# Patient Record
Sex: MALE | Race: AMERICAN INDIAN OR ALASKA NATIVE | NOT HISPANIC OR LATINO | Employment: UNEMPLOYED | URBAN - METROPOLITAN AREA
[De-identification: names, ages, dates, MRNs, and addresses within clinical notes are randomized per-mention and may not be internally consistent; named-entity substitution may affect disease eponyms.]

---

## 2018-12-13 ENCOUNTER — HOSPITAL ENCOUNTER (EMERGENCY)
Facility: HOSPITAL | Age: 38
Discharge: HOME | End: 2018-12-13
Attending: EMERGENCY MEDICINE
Payer: MEDICAID

## 2018-12-13 VITALS
TEMPERATURE: 98 F | DIASTOLIC BLOOD PRESSURE: 68 MMHG | OXYGEN SATURATION: 100 % | SYSTOLIC BLOOD PRESSURE: 146 MMHG | RESPIRATION RATE: 18 BRPM | HEART RATE: 71 BPM

## 2018-12-13 DIAGNOSIS — T78.40XA ALLERGIC REACTION, INITIAL ENCOUNTER: ICD-10-CM

## 2018-12-13 DIAGNOSIS — L50.9 URTICARIAL RASH: Primary | ICD-10-CM

## 2018-12-13 PROCEDURE — 63600175 PHARM REV CODE 636 W HCPCS: Performed by: NURSE PRACTITIONER

## 2018-12-13 PROCEDURE — 96372 THER/PROPH/DIAG INJ SC/IM: CPT

## 2018-12-13 PROCEDURE — 25000003 PHARM REV CODE 250: Performed by: NURSE PRACTITIONER

## 2018-12-13 PROCEDURE — 99284 EMERGENCY DEPT VISIT MOD MDM: CPT | Mod: 25

## 2018-12-13 RX ORDER — FAMOTIDINE 20 MG/1
20 TABLET, FILM COATED ORAL
Status: COMPLETED | OUTPATIENT
Start: 2018-12-13 | End: 2018-12-13

## 2018-12-13 RX ORDER — CETIRIZINE HYDROCHLORIDE 10 MG/1
10 TABLET ORAL DAILY
Qty: 30 TABLET | Refills: 0 | COMMUNITY
Start: 2018-12-13 | End: 2019-07-20

## 2018-12-13 RX ORDER — PREDNISONE 20 MG/1
40 TABLET ORAL DAILY
Qty: 10 TABLET | Refills: 0 | Status: SHIPPED | OUTPATIENT
Start: 2018-12-13 | End: 2018-12-20

## 2018-12-13 RX ORDER — CETIRIZINE HYDROCHLORIDE 10 MG/1
10 TABLET ORAL
Status: COMPLETED | OUTPATIENT
Start: 2018-12-13 | End: 2018-12-13

## 2018-12-13 RX ORDER — DEXAMETHASONE SODIUM PHOSPHATE 4 MG/ML
12 INJECTION, SOLUTION INTRA-ARTICULAR; INTRALESIONAL; INTRAMUSCULAR; INTRAVENOUS; SOFT TISSUE
Status: COMPLETED | OUTPATIENT
Start: 2018-12-13 | End: 2018-12-13

## 2018-12-13 RX ORDER — FAMOTIDINE 20 MG/1
20 TABLET, FILM COATED ORAL 2 TIMES DAILY
Qty: 14 TABLET | Refills: 0 | Status: SHIPPED | OUTPATIENT
Start: 2018-12-13 | End: 2019-07-20

## 2018-12-13 RX ORDER — DIPHENHYDRAMINE HCL 25 MG
25 CAPSULE ORAL EVERY 6 HOURS PRN
Qty: 20 CAPSULE | Refills: 0 | COMMUNITY
Start: 2018-12-13 | End: 2019-07-20

## 2018-12-13 RX ADMIN — FAMOTIDINE 20 MG: 20 TABLET ORAL at 01:12

## 2018-12-13 RX ADMIN — CETIRIZINE HYDROCHLORIDE 10 MG: 10 TABLET, FILM COATED ORAL at 01:12

## 2018-12-13 RX ADMIN — DEXAMETHASONE SODIUM PHOSPHATE 12 MG: 4 INJECTION, SOLUTION INTRAMUSCULAR; INTRAVENOUS at 01:12

## 2018-12-13 NOTE — ED PROVIDER NOTES
Encounter Date: 12/13/2018       History     Chief Complaint   Patient presents with    Urticaria     reports hives began yesterday,  took benadryl with relief. States hives returned, did not take another dose of benadryl.     38-year-old obese male which presents to the emergency room with itchy rash that began yesterday after he ate food at work.  Patient works at the [employer].  He states he is not allergic to anything.  He took a Benadryl yesterday which made it better and then when he woke up this morning some of the rash was back and it got worse as day went on.  Patient denies shortness of breath or scratchy throat.      The history is provided by the patient.     Review of patient's allergies indicates:  No Known Allergies  Past Medical History:   Diagnosis Date    Hypertension      No past surgical history on file.  No family history on file.  Social History     Tobacco Use    Smoking status: Never Smoker   Substance Use Topics    Alcohol use: No    Drug use: No     Review of Systems   Constitutional: Negative for fever.   HENT: Negative for sore throat.    Respiratory: Negative for cough, choking, chest tightness and shortness of breath.    Cardiovascular: Negative for chest pain and leg swelling.   Gastrointestinal: Negative for nausea.   Genitourinary: Negative for dysuria.   Musculoskeletal: Negative for back pain.   Skin: Positive for rash.   Neurological: Negative for weakness.   Hematological: Does not bruise/bleed easily.   All other systems reviewed and are negative.      Physical Exam     Initial Vitals [12/13/18 0133]   BP Pulse Resp Temp SpO2   (!) 154/102 109 18 97.6 °F (36.4 °C) 99 %      MAP       --         Physical Exam    Nursing note and vitals reviewed.  Constitutional: He appears well-developed and well-nourished. He is Obese . He is cooperative.  Non-toxic appearance.   HENT:   Head: Normocephalic and atraumatic.   Right Ear: External ear normal.   Left Ear: External ear  normal.   Nose: Nose normal.   Mouth/Throat: Oropharynx is clear and moist.   Eyes: Conjunctivae and EOM are normal. Pupils are equal, round, and reactive to light.   Neck: Normal range of motion.   Cardiovascular: Normal rate, regular rhythm, S1 normal, S2 normal, normal heart sounds, intact distal pulses and normal pulses. Exam reveals no gallop and no friction rub.    No murmur heard.  Pulmonary/Chest: Breath sounds normal. No stridor. No respiratory distress. He has no wheezes. He has no rhonchi. He has no rales. He exhibits no tenderness.   Abdominal: Normal appearance.   Musculoskeletal: Normal range of motion. He exhibits no edema or tenderness.   Lymphadenopathy:     He has no cervical adenopathy.   Neurological: He is alert and oriented to person, place, and time. He has normal strength. GCS score is 15. GCS eye subscore is 4. GCS verbal subscore is 5. GCS motor subscore is 6.   Skin: Rash noted. Rash is urticarial.   urticarial rash noted to entire body except face       ED Course   Procedures  Labs Reviewed - No data to display        Medical Decision Making:   Initial Assessment:   38-year-old male which presents to the emergency room for evaluation of a rash that began yesterday after he ate food at the [employer].  Patient works at the [employer] and ate some fish and began to have a rash after.  Patient took a Benadryl and rash went away.  He then woke this morning with a rash and as the day has gone on and has gotten worse.  He has not taken any medications today.  Differential Diagnosis:   Allergic reaction, urticaria  ED Management:  Patient examined and noted to have multiple urticarial lesions to entire body except face.  Patient give Decadron IM, Zyrtec Pepcid.  Patient not given Benadryl due to the fact that he is driving.  Patient is in no respiratory distress and will be sent home with 7 day course p.o. steroids, Pepcid b.i.d., Zyrtec daily, and Benadryl q.6 hours.  Patient  given strict return precautions and voiced understanding of all discharge instructions.  Patient stable at discharge.    Patient states that itching is much better after receiving the Decadron injection.                   ED Course as of Dec 13 0224   Thu Dec 13, 2018   0148 BP: (!) 154/102 [AT]   0148 Temp: 97.6 °F (36.4 °C) [AT]   0148 Temp src: Oral [AT]   0148 Pulse: 109 [AT]   0148 Resp: 18 [AT]   0148 SpO2: 99 % [AT]      ED Course User Index  [AT] JENNIFER Alexander     Clinical Impression:   The primary encounter diagnosis was Urticarial rash. A diagnosis of Allergic reaction, initial encounter was also pertinent to this visit.                             JENNIFER Alexander  12/13/18 0226

## 2018-12-13 NOTE — ED TRIAGE NOTES
Pt. Care assumed, pt. Is awake, alert and oriented. Skin is PWD. Denies c/o pain or discomfort, just itching.

## 2018-12-13 NOTE — ED NOTES
"Pt presents with raised itching red hives over entire body that started yesterday. Took Benadryl yesterday and states they "got better" but became worse tonight.  "

## 2019-06-05 ENCOUNTER — HOSPITAL ENCOUNTER (EMERGENCY)
Facility: HOSPITAL | Age: 39
Discharge: HOME | End: 2019-06-05
Attending: EMERGENCY MEDICINE
Payer: MEDICAID

## 2019-06-05 VITALS
SYSTOLIC BLOOD PRESSURE: 159 MMHG | TEMPERATURE: 99 F | OXYGEN SATURATION: 100 % | WEIGHT: 280 LBS | HEART RATE: 72 BPM | BODY MASS INDEX: 39.05 KG/M2 | RESPIRATION RATE: 18 BRPM | DIASTOLIC BLOOD PRESSURE: 105 MMHG

## 2019-06-05 DIAGNOSIS — R51.9 NONINTRACTABLE HEADACHE, UNSPECIFIED CHRONICITY PATTERN, UNSPECIFIED HEADACHE TYPE: Primary | ICD-10-CM

## 2019-06-05 PROCEDURE — 36000 PLACE NEEDLE IN VEIN: CPT

## 2019-06-05 PROCEDURE — 99283 EMERGENCY DEPT VISIT LOW MDM: CPT | Mod: 25

## 2019-06-05 PROCEDURE — 99283 PR EMERGENCY DEPT VISIT,LEVEL III: ICD-10-PCS | Mod: ,,, | Performed by: EMERGENCY MEDICINE

## 2019-06-05 PROCEDURE — 99283 EMERGENCY DEPT VISIT LOW MDM: CPT | Mod: ,,, | Performed by: EMERGENCY MEDICINE

## 2019-06-06 NOTE — ED TRIAGE NOTES
"Jarrod Lopez, a 38 y.o. male presents to the ED w/ complaint of generalized headache with coughing and laughing. The headache started this am. The headache is described as throbbing pain.        Patient took tylenol which helped patient sleep but did not relieve the headache  Patient had headaches in the past, this headache is described as the " 3rd worse headache of my life"     Patient denies chest pain, shortness of breath, or vision changes.       Triage note:  Chief Complaint   Patient presents with    Headache     10/10 headache, relieved with tylenol but came back. Sarah cp or SOB.     Review of patient's allergies indicates:  No Known Allergies  Past Medical History:   Diagnosis Date    Hypertension        "

## 2019-06-06 NOTE — DISCHARGE INSTRUCTIONS
Tylenol and ibuprofen as needed for headache.  REturn to the ED for worsening in any way.    Our goal in the emergency department is to always give you outstanding care and exceptional service. You may receive a survey by mail or e-mail in the next week regarding your experience in our ED. We would greatly appreciate your completing and returning the survey. Your feedback provides us with a way to recognize our staff who give very good care and it helps us learn how to improve when your experience was below our aspiration of excellence.

## 2019-06-06 NOTE — ED NOTES
Patient identifiers verified and correct for Jarrod Lopez.    LOC: The patient is awake, alert and aware of environment with an appropriate affect, the patient is oriented x 3 and speaking appropriately.    APPEARANCE: Patient resting comfortably and in no acute distress, patient is clean and well groomed, patient's clothing is properly fastened.    SKIN: The skin is warm and dry, color consistent with ethnicity, patient has normal skin turgor and moist mucus membranes, skin intact, no breakdown or bruising noted.    MUSCULOSKELETAL: Patient moving all extremities spontaneously, no obvious swelling or deformities noted.    RESPIRATORY: Airway is open and patent, respirations are spontaneous, patient has a normal effort and rate, no accessory muscle use noted, bilateral breath sounds clear     CARDIAC: Patient has a normal rate and regular rhythm, no periphreal edema noted, capillary refill < 3 seconds.    ABDOMEN: Soft and non tender to palpation, no distention noted, normoactive bowel sounds present in all four quadrants.    NEUROLOGIC: PERRLA, 3 mm bilaterally, eyes open spontaneously, behavior appropriate to situation, follows commands, facial expression symmetrical, bilateral hand grasp equal and even, purposeful motor response noted, normal sensation in all extremities when touched with a finger.

## 2019-07-20 ENCOUNTER — HOSPITAL ENCOUNTER (EMERGENCY)
Facility: HOSPITAL | Age: 39
Discharge: HOME | End: 2019-07-20
Attending: EMERGENCY MEDICINE
Payer: MEDICAID

## 2019-07-20 VITALS
HEIGHT: 71 IN | TEMPERATURE: 98 F | HEART RATE: 72 BPM | DIASTOLIC BLOOD PRESSURE: 106 MMHG | WEIGHT: 291 LBS | OXYGEN SATURATION: 98 % | BODY MASS INDEX: 40.74 KG/M2 | RESPIRATION RATE: 18 BRPM | SYSTOLIC BLOOD PRESSURE: 160 MMHG

## 2019-07-20 DIAGNOSIS — R51.9 NONINTRACTABLE HEADACHE, UNSPECIFIED CHRONICITY PATTERN, UNSPECIFIED HEADACHE TYPE: Primary | ICD-10-CM

## 2019-07-20 PROCEDURE — 99282 EMERGENCY DEPT VISIT SF MDM: CPT

## 2019-07-20 PROCEDURE — 99281 PR EMERGENCY DEPT VISIT,LEVEL I: ICD-10-PCS | Mod: ,,, | Performed by: EMERGENCY MEDICINE

## 2019-07-20 PROCEDURE — 99281 EMR DPT VST MAYX REQ PHY/QHP: CPT | Mod: ,,, | Performed by: EMERGENCY MEDICINE

## 2019-07-20 NOTE — ED TRIAGE NOTES
Pt states checked in to ED yesterday but left before a physician could see him and now needs a doctors note to return back to work. Denies any current complaints. Reports checked in yesterday for a HA that he no longer has.

## 2019-07-20 NOTE — ED NOTES
Patient identifiers have been checked and are correct.      LOC: The patient is awake, alert, and aware of environment. The patient is oriented x 3 and speaking appropriately.   APPEARANCE: No acute distress noted.   PSYCHOSOCIAL: Patient is calm and cooperative.  SKIN: The skin is warm, dry  RESPIRATORY: Airway is open and patent. Bilateral chest rise and fall. Respirations are spontaneous, even and unlabored. Normal effort and rate noted. No accessory muscle use noted.   CARDIAC: Patient has a normal rate.  NEUROLOGIC: Eyes open spontaneously. Speech clear. Tolerating saliva secretions well. Able to follow commands, demonstrating ability to actively and appropriately communicate within context of current conversation. Symmetrical facial muscles. Moving all extremities. Movement is purposeful.   MUSCULOSKELETAL: No obvious deformities noted.

## 2019-07-20 NOTE — ED PROVIDER NOTES
Encounter Date: 7/20/2019    SCRIBE #1 NOTE: I, Mena Fowler , am scribing for, and in the presence of,  Dr. Pierre. I have scribed the entire note.       History     Chief Complaint   Patient presents with    Letter for School/Work     lwbs yest     Time patient was seen by the provider: 8:39 AM      The patient is a 38 y.o. male with no known co-morbidities who presents to the ED with a complaint of a head ache since yesterday. He reports he has been out of his Lisinopril. He took 800 MG of Ibuprofen with some resolve of his symptoms. He denies any other associated symptoms.          The history is provided by the patient.     Review of patient's allergies indicates:  No Known Allergies  Past Medical History:   Diagnosis Date    Hypertension      History reviewed. No pertinent surgical history.  History reviewed. No pertinent family history.  Social History     Tobacco Use    Smoking status: Never Smoker    Smokeless tobacco: Never Used   Substance Use Topics    Alcohol use: No    Drug use: No     Review of Systems   Constitutional: Negative for chills and fever.   HENT: Negative for rhinorrhea and sore throat.    Eyes: Negative for photophobia and visual disturbance.   Respiratory: Negative for cough and shortness of breath.    Cardiovascular: Negative for chest pain.   Gastrointestinal: Negative for nausea and vomiting.   Genitourinary: Negative for difficulty urinating and dysuria.   Musculoskeletal: Negative for back pain and neck pain.   Skin: Negative for rash.   Neurological: Positive for headaches. Negative for weakness.   Hematological: Does not bruise/bleed easily.       Physical Exam     Initial Vitals [07/20/19 0830]   BP Pulse Resp Temp SpO2   (!) 160/106 72 18 97.7 °F (36.5 °C) 98 %      MAP       --         Physical Exam    Constitutional: He appears well-developed and well-nourished.   HENT:   Head: Normocephalic.   Eyes: EOM are normal. Pupils are equal, round, and reactive to light.   Neck:  Normal range of motion. Neck supple. No JVD present.   Cardiovascular: Normal rate, regular rhythm and normal heart sounds.   Pulmonary/Chest: Breath sounds normal.   Abdominal: Soft. Bowel sounds are normal.   Musculoskeletal: Normal range of motion.   Neurological: He is alert. He has normal strength. No cranial nerve deficit or sensory deficit. GCS score is 15. GCS eye subscore is 4. GCS verbal subscore is 5. GCS motor subscore is 6.   Skin: Skin is warm. Capillary refill takes less than 2 seconds.         ED Course   Procedures  Labs Reviewed - No data to display       Imaging Results    None          Medical Decision Making:   History:   Old Medical Records: I decided to obtain old medical records.  Initial Assessment:   Patient with head ache now resolved. Sounds like benign head ache. Visit seems to be driven by work excuse today. Will discharge and release home for work. Doubt subarachnoid hemorrhage, stroke, and meningitis.            Scribe Attestation:   Scribe #1: I performed the above scribed service and the documentation accurately describes the services I performed. I attest to the accuracy of the note.               Clinical Impression:       ICD-10-CM ICD-9-CM   1. Nonintractable headache, unspecified chronicity pattern, unspecified headache type R51 784.0                                Power Pierre MD  07/22/19 2675

## 2019-10-27 ENCOUNTER — HOSPITAL ENCOUNTER (EMERGENCY)
Facility: HOSPITAL | Age: 39
Discharge: HOME | End: 2019-10-27
Attending: EMERGENCY MEDICINE
Payer: MEDICAID

## 2019-10-27 VITALS
HEART RATE: 68 BPM | HEIGHT: 71 IN | WEIGHT: 291 LBS | DIASTOLIC BLOOD PRESSURE: 100 MMHG | RESPIRATION RATE: 18 BRPM | BODY MASS INDEX: 40.74 KG/M2 | SYSTOLIC BLOOD PRESSURE: 156 MMHG | OXYGEN SATURATION: 100 % | TEMPERATURE: 99 F

## 2019-10-27 DIAGNOSIS — H60.503 ACUTE OTITIS EXTERNA OF BOTH EARS, UNSPECIFIED TYPE: Primary | ICD-10-CM

## 2019-10-27 PROCEDURE — 99284 EMERGENCY DEPT VISIT MOD MDM: CPT | Mod: ,,, | Performed by: PHYSICIAN ASSISTANT

## 2019-10-27 PROCEDURE — 99284 PR EMERGENCY DEPT VISIT,LEVEL IV: ICD-10-PCS | Mod: ,,, | Performed by: PHYSICIAN ASSISTANT

## 2019-10-27 PROCEDURE — 99284 EMERGENCY DEPT VISIT MOD MDM: CPT

## 2019-10-27 RX ORDER — NEOMYCIN SULFATE, POLYMYXIN B SULFATE AND HYDROCORTISONE 10; 3.5; 1 MG/ML; MG/ML; [USP'U]/ML
4 SUSPENSION/ DROPS AURICULAR (OTIC) 3 TIMES DAILY
Qty: 4 ML | Refills: 0 | Status: SHIPPED | OUTPATIENT
Start: 2019-10-27 | End: 2019-11-01

## 2019-10-27 RX ORDER — AMOXICILLIN AND CLAVULANATE POTASSIUM 875; 125 MG/1; MG/1
1 TABLET, FILM COATED ORAL EVERY 12 HOURS
Qty: 14 TABLET | Refills: 0 | Status: SHIPPED | OUTPATIENT
Start: 2019-10-27 | End: 2019-10-27 | Stop reason: CLARIF

## 2019-10-27 NOTE — ED TRIAGE NOTES
Jarrod Lopez, a 39 y.o. male presents to the ED w/ complaint of bilateral ear pain and fullness.  Patient reports being sick last week. Patient denies any fevers at home.     Triage note:  Chief Complaint   Patient presents with    Otalgia     Review of patient's allergies indicates:  No Known Allergies  Past Medical History:   Diagnosis Date    Hypertension      LOC: Patient name and date of birth verified. The patient is awake, alert and aware of environment with an appropriate affect, the patient is oriented x 3 and speaking appropriately.   APPEARANCE: Patient resting comfortably, patient is clean and well groomed, patient's clothing is properly fastened.  SKIN: The skin is warm and dry, color consistent with ethnicity, patient has normal skin turgor and moist mucus membranes, skin intact, no breakdown or bruising noted.  MUSCULOSKELETAL: Patient moving all extremities well, no obvious swelling or deformities noted.   RESPIRATORY: Respirations are spontaneous, patient has a normal effort and rate, no accessory muscle use noted.  CARDIAC: Patient has a normal rate and rhythm, no periphreal edema noted, capillary refill < 3 seconds.  ABDOMEN: Soft and non tender to palpation, no distention noted. Bowel sounds present in all four quadrants.  NEUROLOGIC: Eyes open spontaneously, behavior appropriate to situation, follows commands, facial expression symmetrical, bilateral hand grasp equal and even, purposeful motor response noted, normal sensation in all extremities when touched with a finger.  HEENT:  Patient endorses bilateral ear pain that he describes as sharp and full.

## 2019-10-27 NOTE — ED PROVIDER NOTES
Encounter Date: 10/27/2019       History     Chief Complaint   Patient presents with    Otalgia     8:49 AM  Patient is a 39-year-old male that presents the ED with bilateral ear pain for 7 days.  Reports associated congestion, postnasal drip, sore throat that all greatly improved, but reports return of bilateral ear pain, left more than right 2 days ago.  Denies any fever, chills, or cough.  Denies any sick contacts.  No other complaints or concerns at this time.        Review of patient's allergies indicates:  No Known Allergies  Past Medical History:   Diagnosis Date    Hypertension      History reviewed. No pertinent surgical history.  History reviewed. No pertinent family history.  Social History     Tobacco Use    Smoking status: Never Smoker    Smokeless tobacco: Never Used   Substance Use Topics    Alcohol use: No    Drug use: No     Review of Systems   Constitutional: Negative for chills and fever.   HENT: Positive for congestion, ear pain, postnasal drip (resolved) and sore throat.    Eyes: Negative for photophobia.   Respiratory: Negative for cough and shortness of breath.    Cardiovascular: Negative for chest pain.   Gastrointestinal: Negative for nausea.   Genitourinary: Negative for dysuria.   Musculoskeletal: Negative for back pain.   Skin: Negative for rash.   Neurological: Negative for weakness.   Hematological: Does not bruise/bleed easily.       Physical Exam     Initial Vitals [10/27/19 0823]   BP Pulse Resp Temp SpO2   (!) 156/100 68 18 98.8 °F (37.1 °C) 100 %      MAP       --         Physical Exam    Vitals reviewed.  Constitutional: He appears well-developed and well-nourished. He is not diaphoretic. He does not have a sickly appearance. No distress.   HENT:   Head: Normocephalic and atraumatic.   Right Ear: Hearing and external ear normal. Tympanic membrane is injected. Tympanic membrane is not erythematous, not retracted and not bulging.   Left Ear: Hearing and external ear normal.  Tympanic membrane is injected. Tympanic membrane is not erythematous, not retracted and not bulging.   Nose: Nose normal.   Mouth/Throat: No oropharyngeal exudate or tonsillar abscesses.   Erythema auditory canal and injected TMs bilaterally, L more than right. Some exudate noted in L ear.    Eyes: Conjunctivae and EOM are normal.   Neck: Normal range of motion.   Pulmonary/Chest: No respiratory distress.   Abdominal: He exhibits no distension. There is no tenderness.   Musculoskeletal: Normal range of motion.   Neurological: He is alert and oriented to person, place, and time. He has normal strength. No sensory deficit.   Skin: Skin is warm and dry. No erythema.   Psychiatric: He has a normal mood and affect. Thought content normal.         ED Course   Procedures  Labs Reviewed - No data to display       Imaging Results    None          Medical Decision Making:   History:   Old Medical Records: I decided to obtain old medical records.  Initial Assessment:   Patient is a 39-year-old male that presents the ED with bilateral ear pain for 7 days.  Differential Diagnosis:   Includes but is not limited to otitis externa, otitis media, viral syndrome, and less likely streptococcal pharyngitis given resolution of sore throat and no exudate seen.  ED Management:  Given history and physical exam, patient has bilateral otitis externa. He had erythema noted to his bilateral auditory canals, L more than right, with some exudate seen. Injected TMs but without budging, erythema, or retracted TMs. Do not suspect otitis medial. will prescribe Cortisporin ear drops.  Educated on use.  Follow-up with PCP if symptoms do not improve.  All questions were answered.  Patient and partner or comfortable with plan, and patient is stable for discharge.                      Clinical Impression:       ICD-10-CM ICD-9-CM   1. Acute otitis externa of both ears, unspecified type H60.503 380.10         Disposition:   Disposition:  Discharged  Condition: Stable                        Maria Luisa Rushing PA-C  10/27/19 0859

## 2019-10-27 NOTE — DISCHARGE INSTRUCTIONS
Use ear drops 3 times a day which is ever 8 hours for 5 days.  Call and follow up with primary care physician at [facility] or [facility] if your symptoms do not improve or worsen or return to the emergency department.    No future appointments.    Our goal in the emergency department is to always give you outstanding care and exceptional service. You may receive a survey by mail or e-mail in the next week regarding your experience in our ED. We would greatly appreciate your completing and returning the survey. Your feedback provides us with a way to recognize our staff who give very good care and it helps us learn how to improve when your experience was below our aspiration of excellence.

## 2019-11-06 ENCOUNTER — SSC ENCOUNTER (OUTPATIENT)
Dept: ADMINISTRATIVE | Facility: OTHER | Age: 39
End: 2019-11-06

## 2019-11-06 NOTE — PROGRESS NOTES
Please note the following patient's information was forwarded to the Medicaid ([place] Medicaid,  [employer], [employer], [facility], [facility], [facility]) Case Management office.    Please see the media section for scanned image of documents sent to Medicaid.    Please contact Outpatient Complex Care Management at ext [phone] with any questions.    Thank you,    Greer Paz, [facility]  Outpatient Case Mgmnt  [phone]

## 2022-10-11 ENCOUNTER — OFFICE VISIT (OUTPATIENT)
Dept: FAMILY MEDICINE | Facility: CLINIC | Age: 42
End: 2022-10-11
Payer: MEDICAID

## 2022-10-11 VITALS
HEIGHT: 71 IN | BODY MASS INDEX: 41.98 KG/M2 | TEMPERATURE: 98 F | DIASTOLIC BLOOD PRESSURE: 98 MMHG | HEART RATE: 103 BPM | SYSTOLIC BLOOD PRESSURE: 152 MMHG | WEIGHT: 299.88 LBS | OXYGEN SATURATION: 97 %

## 2022-10-11 DIAGNOSIS — M54.9 CHRONIC BACK PAIN GREATER THAN 3 MONTHS DURATION: ICD-10-CM

## 2022-10-11 DIAGNOSIS — I10 ESSENTIAL HYPERTENSION: Primary | ICD-10-CM

## 2022-10-11 DIAGNOSIS — E55.9 VITAMIN D DEFICIENCY: ICD-10-CM

## 2022-10-11 DIAGNOSIS — E05.90 HYPERTHYROIDISM: ICD-10-CM

## 2022-10-11 DIAGNOSIS — G89.29 CHRONIC BACK PAIN GREATER THAN 3 MONTHS DURATION: ICD-10-CM

## 2022-10-11 PROCEDURE — 3008F PR BODY MASS INDEX (BMI) DOCUMENTED: ICD-10-PCS | Mod: CPTII,,, | Performed by: NURSE PRACTITIONER

## 2022-10-11 PROCEDURE — 4010F ACE/ARB THERAPY RXD/TAKEN: CPT | Mod: CPTII,,, | Performed by: NURSE PRACTITIONER

## 2022-10-11 PROCEDURE — 99204 OFFICE O/P NEW MOD 45 MIN: CPT | Mod: S$PBB,,, | Performed by: NURSE PRACTITIONER

## 2022-10-11 PROCEDURE — 3077F SYST BP >= 140 MM HG: CPT | Mod: CPTII,,, | Performed by: NURSE PRACTITIONER

## 2022-10-11 PROCEDURE — 99204 PR OFFICE/OUTPT VISIT, NEW, LEVL IV, 45-59 MIN: ICD-10-PCS | Mod: S$PBB,,, | Performed by: NURSE PRACTITIONER

## 2022-10-11 PROCEDURE — 1159F MED LIST DOCD IN RCRD: CPT | Mod: CPTII,,, | Performed by: NURSE PRACTITIONER

## 2022-10-11 PROCEDURE — 4010F PR ACE/ARB THEARPY RXD/TAKEN: ICD-10-PCS | Mod: CPTII,,, | Performed by: NURSE PRACTITIONER

## 2022-10-11 PROCEDURE — 3080F PR MOST RECENT DIASTOLIC BLOOD PRESSURE >= 90 MM HG: ICD-10-PCS | Mod: CPTII,,, | Performed by: NURSE PRACTITIONER

## 2022-10-11 PROCEDURE — 3008F BODY MASS INDEX DOCD: CPT | Mod: CPTII,,, | Performed by: NURSE PRACTITIONER

## 2022-10-11 PROCEDURE — 3080F DIAST BP >= 90 MM HG: CPT | Mod: CPTII,,, | Performed by: NURSE PRACTITIONER

## 2022-10-11 PROCEDURE — 3077F PR MOST RECENT SYSTOLIC BLOOD PRESSURE >= 140 MM HG: ICD-10-PCS | Mod: CPTII,,, | Performed by: NURSE PRACTITIONER

## 2022-10-11 PROCEDURE — 1159F PR MEDICATION LIST DOCUMENTED IN MEDICAL RECORD: ICD-10-PCS | Mod: CPTII,,, | Performed by: NURSE PRACTITIONER

## 2022-10-11 PROCEDURE — 99204 OFFICE O/P NEW MOD 45 MIN: CPT | Performed by: NURSE PRACTITIONER

## 2022-10-11 RX ORDER — AMLODIPINE BESYLATE 10 MG/1
10 TABLET ORAL DAILY
Qty: 30 TABLET | Refills: 3 | Status: SHIPPED | OUTPATIENT
Start: 2022-10-11 | End: 2023-03-01 | Stop reason: SDUPTHER

## 2022-10-11 RX ORDER — MELOXICAM 15 MG/1
15 TABLET ORAL DAILY
Qty: 30 TABLET | Refills: 1 | Status: SHIPPED | OUTPATIENT
Start: 2022-10-11 | End: 2022-11-16 | Stop reason: ALTCHOICE

## 2022-10-11 RX ORDER — OXYCODONE AND ACETAMINOPHEN 10; 325 MG/1; MG/1
1 TABLET ORAL 4 TIMES DAILY PRN
COMMUNITY
Start: 2022-09-21 | End: 2022-10-11

## 2022-10-11 RX ORDER — LOSARTAN POTASSIUM 25 MG/1
25 TABLET ORAL DAILY
COMMUNITY
Start: 2022-09-01 | End: 2022-10-11 | Stop reason: SDUPTHER

## 2022-10-11 RX ORDER — ERGOCALCIFEROL 1.25 MG/1
50000 CAPSULE ORAL
Qty: 4 CAPSULE | Refills: 2 | Status: SHIPPED | OUTPATIENT
Start: 2022-10-11 | End: 2022-11-16 | Stop reason: ALTCHOICE

## 2022-10-11 RX ORDER — LOSARTAN POTASSIUM 25 MG/1
25 TABLET ORAL DAILY
Qty: 30 TABLET | Refills: 3 | Status: SHIPPED | OUTPATIENT
Start: 2022-10-11 | End: 2022-11-01 | Stop reason: DRUGHIGH

## 2022-10-11 NOTE — PROGRESS NOTES
Patient ID: Jarrod Lopez is a 42 y.o. male.    Chief Complaint: Establish Care    Mr. Lopez presents today to establish care with me as his PCP. He is new to the [facility] but he was previously seen by Dr. Melchor in [facility] where he had labs drawn and work up done there and results are scanned into Epic. He states he has a PMH of hypertension which he is on the medication for. He also states he has low back pain that is chronic in nature that has been present for at least 6 months that he would like to have evaluated by specialist. He denies any other issues or complaints at this time.     Back Pain  This is a chronic problem. The current episode started more than 1 month ago. The problem occurs constantly. The problem has been gradually worsening since onset. The pain is present in the lumbar spine. The quality of the pain is described as aching. The pain does not radiate. The pain is at a severity of 6/10. The pain is moderate. The pain is The same all the time. The symptoms are aggravated by bending. Stiffness is present All day. Associated symptoms include numbness (numbness in lower extremity). Pertinent negatives include no abdominal pain, chest pain, fever or headaches. Risk factors include poor posture, obesity, lack of exercise and sedentary lifestyle. He has tried heat, home exercises, NSAIDs and chiropractic manipulation for the symptoms. The treatment provided mild relief.   Hypertension  This is a chronic problem. The current episode started more than 1 month ago. The problem has been waxing and waning since onset. The problem is uncontrolled. Associated symptoms include malaise/fatigue. Pertinent negatives include no chest pain, headaches or shortness of breath. There are no associated agents to hypertension. Risk factors for coronary artery disease include male gender, obesity, family history and sedentary lifestyle. Past treatments include calcium channel blockers and angiotensin blockers. The  current treatment provides moderate improvement. There are no compliance problems.      We reviewed [employer] maintenance.    Past Medical History:   Diagnosis Date    Hypertension      History reviewed. No pertinent surgical history.      Tobacco History:  reports that he has never smoked. He has never used smokeless tobacco.      Review of patient's allergies indicates:  No Known Allergies    Current Outpatient Medications:     amLODIPine (NORVASC) 10 MG tablet, Take 1 tablet (10 mg total) by mouth once daily., Disp: 30 tablet, Rfl: 3    ergocalciferol (ERGOCALCIFEROL) 50,000 unit Cap, Take 1 capsule (50,000 Units total) by mouth every 7 days., Disp: 4 capsule, Rfl: 2    losartan (COZAAR) 25 MG tablet, Take 1 tablet (25 mg total) by mouth once daily., Disp: 30 tablet, Rfl: 3    meloxicam (MOBIC) 15 MG tablet, Take 1 tablet (15 mg total) by mouth once daily., Disp: 30 tablet, Rfl: 1    Review of Systems   Constitutional:  Positive for malaise/fatigue. Negative for activity change, appetite change, fatigue and fever.   HENT:  Negative for congestion, dental problem, nosebleeds, postnasal drip, rhinorrhea, sinus pain, sore throat and tinnitus.    Eyes:  Negative for pain, discharge, itching and visual disturbance.   Respiratory:  Negative for cough, chest tightness, shortness of breath and wheezing.    Cardiovascular:  Negative for chest pain.   Gastrointestinal:  Negative for abdominal pain, blood in stool, constipation, diarrhea, nausea and vomiting.   Endocrine: Negative for cold intolerance, heat intolerance, polydipsia, polyphagia and polyuria.   Genitourinary:  Negative for difficulty urinating, flank pain, genital sores, hematuria and urgency.   Musculoskeletal:  Positive for arthralgias and back pain. Negative for myalgias.   Skin:  Negative for rash and wound.   Allergic/Immunologic: Negative for environmental allergies and food allergies.   Neurological:  Positive for numbness (numbness in lower  "extremity). Negative for dizziness, light-headedness and headaches.   Hematological:  Negative for adenopathy. Does not bruise/bleed easily.   Psychiatric/Behavioral:  Negative for behavioral problems, confusion, decreased concentration, sleep disturbance and suicidal ideas. The patient is not nervous/anxious and is not hyperactive.         Objective:      Vitals:    10/11/22 0829   BP: (!) 152/98   Pulse: 103   Temp: 98.1 °F (36.7 °C)   TempSrc: Oral   SpO2: 97%   Weight: 136 kg (299 lb 14.4 oz)   Height: 5' 11" (1.803 m)     Physical Exam  Vitals reviewed.   Constitutional:       General: He is not in acute distress.     Appearance: Normal appearance. He is normal weight. He is not ill-appearing, toxic-appearing or diaphoretic.   HENT:      Head: Normocephalic and atraumatic.      Right Ear: Tympanic membrane and external ear normal. There is no impacted cerumen.      Left Ear: Tympanic membrane and external ear normal. There is no impacted cerumen.      Nose: Nose normal. No congestion or rhinorrhea.      Mouth/Throat:      Mouth: Mucous membranes are moist.      Pharynx: Oropharynx is clear. No oropharyngeal exudate or posterior oropharyngeal erythema.   Eyes:      General: No scleral icterus.        Right eye: No discharge.         Left eye: No discharge.      Extraocular Movements: Extraocular movements intact.      Conjunctiva/sclera: Conjunctivae normal.      Pupils: Pupils are equal, round, and reactive to light.   Cardiovascular:      Rate and Rhythm: Normal rate and regular rhythm.      Pulses: Normal pulses.      Heart sounds: Normal heart sounds. No murmur heard.    No friction rub. No gallop.   Pulmonary:      Effort: Pulmonary effort is normal. No respiratory distress.      Breath sounds: Normal breath sounds. No wheezing, rhonchi or rales.   Chest:      Chest wall: No tenderness.   Abdominal:      General: Abdomen is flat. Bowel sounds are normal.      Palpations: Abdomen is soft. There is no mass. "      Tenderness: There is no abdominal tenderness. There is no guarding or rebound.   Musculoskeletal:         General: No swelling or signs of injury.      Cervical back: Normal range of motion and neck supple. No rigidity or tenderness.      Lumbar back: Tenderness and bony tenderness present. Decreased range of motion. Negative right straight leg raise test and negative left straight leg raise test.        Back:       Right lower leg: No edema.      Left lower leg: No edema.   Skin:     General: Skin is warm and dry.      Capillary Refill: Capillary refill takes less than 2 seconds.      Coloration: Skin is not pale.      Findings: No bruising or erythema.   Neurological:      General: No focal deficit present.      Mental Status: He is alert and oriented to person, place, and time. Mental status is at baseline.      Motor: No weakness.      Coordination: Coordination normal.      Gait: Gait normal.   Psychiatric:         Mood and Affect: Mood normal.         Behavior: Behavior normal.         Thought Content: Thought content normal.         Judgment: Judgment normal.         Assessment:       1. Essential hypertension    2. Chronic back pain greater than 3 months duration    3. Vitamin D deficiency    4. Hyperthyroidism           Plan:       Essential hypertension  -     amLODIPine (NORVASC) 10 MG tablet; Take 1 tablet (10 mg total) by mouth once daily.  Dispense: 30 tablet; Refill: 3  -     losartan (COZAAR) 25 MG tablet; Take 1 tablet (25 mg total) by mouth once daily.  Dispense: 30 tablet; Refill: 3    Chronic back pain greater than 3 months duration  -     Ambulatory referral/consult to Orthopedics; Future; Expected date: 10/18/2022  -     meloxicam (MOBIC) 15 MG tablet; Take 1 tablet (15 mg total) by mouth once daily.  Dispense: 30 tablet; Refill: 1    Vitamin D deficiency  -     ergocalciferol (ERGOCALCIFEROL) 50,000 unit Cap; Take 1 capsule (50,000 Units total) by mouth every 7 days.  Dispense: 4 capsule;  Refill: 2    Hyperthyroidism  -     TSH; Future; Expected date: 10/11/2022  -     T4, free; Future; Expected date: 10/11/2022      Follow up in about 3 weeks (around 11/1/2022), or if symptoms worsen or fail to improve, for Hypertension/Back pain .        10/11/2022 JANAK Choi, FNP-C

## 2022-10-17 ENCOUNTER — OFFICE VISIT (OUTPATIENT)
Dept: ORTHOPEDICS | Facility: CLINIC | Age: 42
End: 2022-10-17
Payer: MEDICAID

## 2022-10-17 VITALS
DIASTOLIC BLOOD PRESSURE: 88 MMHG | HEIGHT: 71 IN | SYSTOLIC BLOOD PRESSURE: 148 MMHG | WEIGHT: 299 LBS | BODY MASS INDEX: 41.86 KG/M2

## 2022-10-17 DIAGNOSIS — M51.36 DDD (DEGENERATIVE DISC DISEASE), LUMBAR: ICD-10-CM

## 2022-10-17 DIAGNOSIS — M43.10 RETROLISTHESIS: Primary | ICD-10-CM

## 2022-10-17 PROCEDURE — 4010F PR ACE/ARB THEARPY RXD/TAKEN: ICD-10-PCS | Mod: CPTII,S$GLB,, | Performed by: ORTHOPAEDIC SURGERY

## 2022-10-17 PROCEDURE — 4010F ACE/ARB THERAPY RXD/TAKEN: CPT | Mod: CPTII,S$GLB,, | Performed by: ORTHOPAEDIC SURGERY

## 2022-10-17 PROCEDURE — 99203 PR OFFICE/OUTPT VISIT, NEW, LEVL III, 30-44 MIN: ICD-10-PCS | Mod: S$GLB,,, | Performed by: ORTHOPAEDIC SURGERY

## 2022-10-17 PROCEDURE — 3079F DIAST BP 80-89 MM HG: CPT | Mod: CPTII,S$GLB,, | Performed by: ORTHOPAEDIC SURGERY

## 2022-10-17 PROCEDURE — 1160F PR REVIEW ALL MEDS BY PRESCRIBER/CLIN PHARMACIST DOCUMENTED: ICD-10-PCS | Mod: CPTII,S$GLB,, | Performed by: ORTHOPAEDIC SURGERY

## 2022-10-17 PROCEDURE — 3077F SYST BP >= 140 MM HG: CPT | Mod: CPTII,S$GLB,, | Performed by: ORTHOPAEDIC SURGERY

## 2022-10-17 PROCEDURE — 99203 OFFICE O/P NEW LOW 30 MIN: CPT | Mod: S$GLB,,, | Performed by: ORTHOPAEDIC SURGERY

## 2022-10-17 PROCEDURE — 3077F PR MOST RECENT SYSTOLIC BLOOD PRESSURE >= 140 MM HG: ICD-10-PCS | Mod: CPTII,S$GLB,, | Performed by: ORTHOPAEDIC SURGERY

## 2022-10-17 PROCEDURE — 1159F PR MEDICATION LIST DOCUMENTED IN MEDICAL RECORD: ICD-10-PCS | Mod: CPTII,S$GLB,, | Performed by: ORTHOPAEDIC SURGERY

## 2022-10-17 PROCEDURE — 1159F MED LIST DOCD IN RCRD: CPT | Mod: CPTII,S$GLB,, | Performed by: ORTHOPAEDIC SURGERY

## 2022-10-17 PROCEDURE — 1160F RVW MEDS BY RX/DR IN RCRD: CPT | Mod: CPTII,S$GLB,, | Performed by: ORTHOPAEDIC SURGERY

## 2022-10-17 PROCEDURE — 3079F PR MOST RECENT DIASTOLIC BLOOD PRESSURE 80-89 MM HG: ICD-10-PCS | Mod: CPTII,S$GLB,, | Performed by: ORTHOPAEDIC SURGERY

## 2022-10-17 NOTE — PROGRESS NOTES
Subjective:       Patient ID: Jarrod Lopez is a 42 y.o. male.    Chief Complaint: Pain and Numbness of the Lumbar Spine (Lumbar pain that started a few months ago he woke up with pain mainly on his right side. States that the pain radiates down BL legs to feet, but right side is worse. States that his right side is numb. States that the numbness in his leg causes falls)      History of Present Illness    Prior to meeting with the patient I reviewed the medical chart in [facility]. This included reviewing the previous progress notes from our office, review of the patient's last appointment with their primary care provider, review of any visits to the emergency room, and review of any pain management appointments or procedures.      Six-month history of low back pain radiating down right leg mainly to the calf sometimes to the thigh no bowel or bladder dysfunction tried chiropractic treatments for several months which was unsuccessful.       Current Medications  Current Outpatient Medications   Medication Sig Dispense Refill    amLODIPine (NORVASC) 10 MG tablet Take 1 tablet (10 mg total) by mouth once daily. 30 tablet 3    losartan (COZAAR) 25 MG tablet Take 1 tablet (25 mg total) by mouth once daily. 30 tablet 3    ergocalciferol (ERGOCALCIFEROL) 50,000 unit Cap Take 1 capsule (50,000 Units total) by mouth every 7 days. (Patient not taking: Reported on 10/17/2022) 4 capsule 2    meloxicam (MOBIC) 15 MG tablet Take 1 tablet (15 mg total) by mouth once daily. (Patient not taking: Reported on 10/17/2022) 30 tablet 1     No current facility-administered medications for this visit.       Allergies  Review of patient's allergies indicates:  No Known Allergies    Past Medical History  Past Medical History:   Diagnosis Date    Hypertension        Surgical History  History reviewed. No pertinent surgical history.    Family History:   Family History   Problem Relation Age of Onset    Hypertension Mother        Social History:    Social History     Socioeconomic History    Marital status: Single    Number of children: 5   Tobacco Use    Smoking status: Never    Smokeless tobacco: Never   Substance and Sexual Activity    Alcohol use: No    Drug use: Yes     Types: Marijuana    Sexual activity: Not Currently     Partners: Female       Hospitalization/Major Diagnostic Procedure:     Review of Systems     General/Constitutional:  Chills denies. Fatigue denies. Fever denies. Weight gain denies. Weight loss denies.    Respiratory:  Shortness of breath denies.    Cardiovascular:  Chest pain denies.    Gastrointestinal:  Constipation denies. Diarrhea denies. Nausea denies. Vomiting denies.     Hematology:  Easy bruising denies. Prolonged bleeding denies.     Genitourinary:  Frequent urination denies. Pain in lower back denies. Painful urination denies.     Musculoskeletal:  See HPI for details    Skin:  Rash denies.    Neurologic:  Dizziness denies. Gait abnormalities denies. Seizures denies. Tingling/Numbess denies.    Psychiatric:  Anxiety denies. Depressed mood denies.     Objective:   Vital Signs:   Vitals:    10/17/22 0802   BP: (!) 148/88        Physical Exam      General Examination:     Constitutional: The patient is alert and oriented to lace person and time. Mood is pleasant.     Head/Face: Normal facial features normal eyebrows    Eyes: Normal extraocular motion bilaterally    Lungs: Respirations are equal and unlabored    Gait is coordinated.    Cardiovascular: There are no swelling or varicosities present.    Lymphatic: Negative for adenopathy    Skin: Normal    Neurological: Level of consciousness normal. Oriented to place person and time and situation    Psychiatric: Oriented to time place person and situation    0 based speech patient can stand erect has pain when doing so tenderness right posterior iliac spine range of motion moderate restricted hip and knee range of motion normal straight leg raising maneuver negative pulses  intact motor exam normal no edema adenopathy varicosities    XRAY Report/ Interpretation :  AP pelvis x-ray was taken today. Indications low back pain and/or hip pain. Findings AP pelvis x-ray appears to be normal with no evidence of fractures or significant degenerative disease lumbar x-rays performed September 28 was reviewed they show moderate disc space narrowing L2-3 retrolisthesis L3-4 longstanding      Assessment:       1. Retrolisthesis    2. DDD (degenerative disc disease), lumbar          Plan:       Jarrod was seen today for pain and numbness.    Diagnoses and all orders for this visit:    Retrolisthesis  Comments:  L4-L5  Orders:  -     X-Ray Pelvis Routine AP  -     MRI Lumbar Spine Without Contrast; Future    DDD (degenerative disc disease), lumbar  -     MRI Lumbar Spine Without Contrast; Future       Follow up in about 2 weeks (around 10/31/2022) for MRI Lumbar Results .    Because of duration of his symptoms and failure of conservative measures including chiropractic treatments I advised MRI study of lumbar spine.  Treatment options were discussed with regards to the nature of the medical condition. Conservative pain intervention and surgical options were discussed in detail. The probability of success of each separate treatment option was discussed. The patient expressed a clear understanding of the treatment options. With regards to surgery, the procedure risk, benefits, complications, and outcomes were discussed. No guarantees were given with regards to surgical outcome.   The risk of complications, morbidity, and mortality of patient management decisions have been made at the time of this visit. These are associated with the patient's problems, diagnostic procedures and treatment options. This includes the possible management options selected and those considered but not selected by the patient after shared medical decision making we discussed with the patient.   This note was created using Dragon  voice recognition software that occasionally misinterpreted phrases or words.

## 2022-10-25 ENCOUNTER — PATIENT MESSAGE (OUTPATIENT)
Dept: ORTHOPEDICS | Facility: CLINIC | Age: 42
End: 2022-10-25

## 2022-10-25 DIAGNOSIS — M51.36 DDD (DEGENERATIVE DISC DISEASE), LUMBAR: Primary | ICD-10-CM

## 2022-10-31 ENCOUNTER — LAB VISIT (OUTPATIENT)
Dept: LAB | Facility: HOSPITAL | Age: 42
End: 2022-10-31
Attending: NURSE PRACTITIONER
Payer: MEDICAID

## 2022-10-31 DIAGNOSIS — E05.90 HYPERTHYROIDISM: ICD-10-CM

## 2022-10-31 LAB
T4 FREE SERPL-MCNC: 0.87 NG/DL (ref 0.71–1.51)
TSH SERPL DL<=0.005 MIU/L-ACNC: 0.74 UIU/ML (ref 0.34–5.6)

## 2022-10-31 PROCEDURE — 84443 ASSAY THYROID STIM HORMONE: CPT | Performed by: NURSE PRACTITIONER

## 2022-10-31 PROCEDURE — 84439 ASSAY OF FREE THYROXINE: CPT | Performed by: NURSE PRACTITIONER

## 2022-10-31 PROCEDURE — 36415 COLL VENOUS BLD VENIPUNCTURE: CPT | Performed by: NURSE PRACTITIONER

## 2022-11-01 ENCOUNTER — OFFICE VISIT (OUTPATIENT)
Dept: FAMILY MEDICINE | Facility: CLINIC | Age: 42
End: 2022-11-01
Payer: MEDICAID

## 2022-11-01 VITALS
OXYGEN SATURATION: 99 % | BODY MASS INDEX: 42.58 KG/M2 | DIASTOLIC BLOOD PRESSURE: 98 MMHG | TEMPERATURE: 98 F | HEIGHT: 71 IN | HEART RATE: 77 BPM | WEIGHT: 304.13 LBS | SYSTOLIC BLOOD PRESSURE: 158 MMHG

## 2022-11-01 DIAGNOSIS — M54.41 MIDLINE LOW BACK PAIN WITH RIGHT-SIDED SCIATICA, UNSPECIFIED CHRONICITY: Primary | ICD-10-CM

## 2022-11-01 DIAGNOSIS — I10 ESSENTIAL HYPERTENSION: ICD-10-CM

## 2022-11-01 PROCEDURE — 1159F PR MEDICATION LIST DOCUMENTED IN MEDICAL RECORD: ICD-10-PCS | Mod: CPTII,,, | Performed by: NURSE PRACTITIONER

## 2022-11-01 PROCEDURE — 3077F PR MOST RECENT SYSTOLIC BLOOD PRESSURE >= 140 MM HG: ICD-10-PCS | Mod: CPTII,,, | Performed by: NURSE PRACTITIONER

## 2022-11-01 PROCEDURE — 1159F MED LIST DOCD IN RCRD: CPT | Mod: CPTII,,, | Performed by: NURSE PRACTITIONER

## 2022-11-01 PROCEDURE — 99213 PR OFFICE/OUTPT VISIT, EST, LEVL III, 20-29 MIN: ICD-10-PCS | Mod: S$PBB,,, | Performed by: NURSE PRACTITIONER

## 2022-11-01 PROCEDURE — 3080F PR MOST RECENT DIASTOLIC BLOOD PRESSURE >= 90 MM HG: ICD-10-PCS | Mod: CPTII,,, | Performed by: NURSE PRACTITIONER

## 2022-11-01 PROCEDURE — 4010F ACE/ARB THERAPY RXD/TAKEN: CPT | Mod: CPTII,,, | Performed by: NURSE PRACTITIONER

## 2022-11-01 PROCEDURE — 4010F PR ACE/ARB THEARPY RXD/TAKEN: ICD-10-PCS | Mod: CPTII,,, | Performed by: NURSE PRACTITIONER

## 2022-11-01 PROCEDURE — 3080F DIAST BP >= 90 MM HG: CPT | Mod: CPTII,,, | Performed by: NURSE PRACTITIONER

## 2022-11-01 PROCEDURE — 99213 OFFICE O/P EST LOW 20 MIN: CPT | Mod: S$PBB,,, | Performed by: NURSE PRACTITIONER

## 2022-11-01 PROCEDURE — 99214 OFFICE O/P EST MOD 30 MIN: CPT | Performed by: NURSE PRACTITIONER

## 2022-11-01 PROCEDURE — 3077F SYST BP >= 140 MM HG: CPT | Mod: CPTII,,, | Performed by: NURSE PRACTITIONER

## 2022-11-01 RX ORDER — TRAMADOL HYDROCHLORIDE 50 MG/1
50 TABLET ORAL EVERY 12 HOURS PRN
Qty: 30 TABLET | Refills: 0 | Status: SHIPPED | OUTPATIENT
Start: 2022-11-01 | End: 2023-05-03 | Stop reason: ALTCHOICE

## 2022-11-01 RX ORDER — LOSARTAN POTASSIUM 50 MG/1
50 TABLET ORAL DAILY
Qty: 90 TABLET | Refills: 3 | Status: SHIPPED | OUTPATIENT
Start: 2022-11-01 | End: 2023-03-01 | Stop reason: SDUPTHER

## 2022-11-01 NOTE — PROGRESS NOTES
Patient ID: Jarrod Lopez is a 42 y.o. male.    Chief Complaint: Back Pain    Mr. Lopez presents today for reevaluation of back pain and lab review. He is also her for blood pressure recheck. He states he was seen by Dr. Mccarty in this interval and he states he has to do physical therapy for 6 weeks minimally prior to  him having any further imaging or work up regarding his back pain. He denies any new injuries to his back but he rates pain 10/10 and it is interfering with his life a great deal. He states he had something in the past for pain given to him by previous provider that helped but he no longer has medication. He states he has been taking blood pressure medication as prescribed but he remains elevated during this visit.     Back Pain  This is a recurrent problem. The current episode started more than 1 month ago. The problem occurs constantly. The problem has been gradually worsening since onset. The pain is present in the lumbar spine. The quality of the pain is described as aching and shooting. The pain radiates to the right thigh and right knee. The pain is severe. The pain is The same all the time. The symptoms are aggravated by position. Stiffness is present All day. Associated symptoms include headaches. Pertinent negatives include no abdominal pain, chest pain or fever. He has tried chiropractic manipulation, analgesics, home exercises and NSAIDs for the symptoms. The treatment provided no relief.   Hypertension  This is a new problem. The current episode started more than 1 month ago. The problem has been waxing and waning since onset. The problem is uncontrolled. Associated symptoms include headaches. Pertinent negatives include no chest pain or shortness of breath. Agents associated with hypertension include NSAIDs. Risk factors for coronary artery disease include family history, male gender and obesity. The current treatment provides mild improvement.         Past Medical History:   Diagnosis Date     Hypertension      No past surgical history on file.      Tobacco History:  reports that he has never smoked. He has never used smokeless tobacco.      Review of patient's allergies indicates:  No Known Allergies    Current Outpatient Medications:     amLODIPine (NORVASC) 10 MG tablet, Take 1 tablet (10 mg total) by mouth once daily., Disp: 30 tablet, Rfl: 3    ergocalciferol (ERGOCALCIFEROL) 50,000 unit Cap, Take 1 capsule (50,000 Units total) by mouth every 7 days. (Patient not taking: Reported on 10/17/2022), Disp: 4 capsule, Rfl: 2    losartan (COZAAR) 50 MG tablet, Take 1 tablet (50 mg total) by mouth once daily., Disp: 90 tablet, Rfl: 3    meloxicam (MOBIC) 15 MG tablet, Take 1 tablet (15 mg total) by mouth once daily. (Patient not taking: Reported on 10/17/2022), Disp: 30 tablet, Rfl: 1    traMADoL (ULTRAM) 50 mg tablet, Take 1 tablet (50 mg total) by mouth every 12 (twelve) hours as needed for Pain., Disp: 30 tablet, Rfl: 0    Review of Systems   Constitutional:  Negative for activity change, appetite change, fatigue and fever.   HENT:  Negative for congestion, dental problem, nosebleeds, postnasal drip, rhinorrhea, sinus pain, sore throat and tinnitus.    Eyes:  Negative for pain, discharge, itching and visual disturbance.   Respiratory:  Negative for cough, chest tightness, shortness of breath and wheezing.    Cardiovascular:  Negative for chest pain.   Gastrointestinal:  Negative for abdominal pain, blood in stool, constipation, diarrhea, nausea and vomiting.   Endocrine: Negative for cold intolerance, heat intolerance, polydipsia, polyphagia and polyuria.   Genitourinary:  Negative for difficulty urinating, flank pain, genital sores, hematuria and urgency.   Musculoskeletal:  Positive for back pain and myalgias. Negative for arthralgias.   Skin:  Negative for rash and wound.   Allergic/Immunologic: Negative for environmental allergies and food allergies.   Neurological:  Positive for headaches.  "Negative for dizziness and light-headedness.   Hematological:  Negative for adenopathy. Does not bruise/bleed easily.   Psychiatric/Behavioral:  Positive for dysphoric mood. Negative for behavioral problems, confusion, decreased concentration, sleep disturbance and suicidal ideas. The patient is not nervous/anxious and is not hyperactive.         Objective:      Vitals:    11/01/22 0811   BP: (!) 158/98   Pulse: 77   Temp: 97.9 °F (36.6 °C)   TempSrc: Oral   SpO2: 99%   Weight: (!) 137.9 kg (304 lb 1.6 oz)   Height: 5' 11" (1.803 m)     Physical Exam  Vitals reviewed.   Constitutional:       General: He is not in acute distress.     Appearance: Normal appearance. He is normal weight. He is not ill-appearing, toxic-appearing or diaphoretic.   HENT:      Head: Normocephalic and atraumatic.      Right Ear: Tympanic membrane and external ear normal. There is no impacted cerumen.      Left Ear: Tympanic membrane and external ear normal. There is no impacted cerumen.      Nose: Nose normal. No congestion or rhinorrhea.      Mouth/Throat:      Mouth: Mucous membranes are moist.      Pharynx: Oropharynx is clear. No oropharyngeal exudate or posterior oropharyngeal erythema.   Eyes:      General: No scleral icterus.        Right eye: No discharge.         Left eye: No discharge.      Extraocular Movements: Extraocular movements intact.      Conjunctiva/sclera: Conjunctivae normal.      Pupils: Pupils are equal, round, and reactive to light.   Cardiovascular:      Rate and Rhythm: Normal rate and regular rhythm.      Pulses: Normal pulses.      Heart sounds: Normal heart sounds. No murmur heard.    No friction rub. No gallop.   Pulmonary:      Effort: Pulmonary effort is normal. No respiratory distress.      Breath sounds: Normal breath sounds. No wheezing, rhonchi or rales.   Chest:      Chest wall: No tenderness.   Abdominal:      General: Abdomen is flat. Bowel sounds are normal.      Palpations: Abdomen is soft. There is " no mass.      Tenderness: There is no abdominal tenderness. There is no guarding or rebound.   Musculoskeletal:         General: No swelling or signs of injury. Normal range of motion.      Cervical back: Normal range of motion and neck supple. No rigidity or tenderness.      Lumbar back: Tenderness present.        Back:       Right lower leg: No edema.      Left lower leg: No edema.   Skin:     General: Skin is warm and dry.      Capillary Refill: Capillary refill takes less than 2 seconds.      Coloration: Skin is not pale.      Findings: No bruising or erythema.   Neurological:      General: No focal deficit present.      Mental Status: He is alert and oriented to person, place, and time. Mental status is at baseline.      Motor: No weakness.      Coordination: Coordination normal.      Gait: Gait normal.   Psychiatric:         Mood and Affect: Mood normal.         Behavior: Behavior normal.         Thought Content: Thought content normal.         Judgment: Judgment normal.         Assessment:       1. Midline low back pain with right-sided sciatica, unspecified chronicity    2. Essential hypertension           Plan:       Midline low back pain with right-sided sciatica, unspecified chronicity  -     traMADoL (ULTRAM) 50 mg tablet; Take 1 tablet (50 mg total) by mouth every 12 (twelve) hours as needed for Pain.  Dispense: 30 tablet; Refill: 0    Essential hypertension  -     losartan (COZAAR) 50 MG tablet; Take 1 tablet (50 mg total) by mouth once daily.  Dispense: 90 tablet; Refill: 3      Follow up in about 3 months (around 2/1/2023), or if symptoms worsen or fail to improve, for Hyptension .        11/1/2022 JANAK Choi, FNP-C

## 2022-11-01 NOTE — LETTER
2022    Jarrod Lopez  [address]  [place] [address]             [facility]  [address]  [place] [address]  Phone: [phone]  Fax: [phone] To Whom It May Concern:    I am writing this letter on the behalf of my patient Jarrod Lopez  1980. He is physically unable to work at this time due to chronic back pain and he is currently under the care of Orthopedic surgeon for further work up and interventions that can include  Physical therapy with possible surgery. I do not feel this is a permanent disability but I do feel he is not able to complete task at this time.Further direction of care would come from orthopedic surgeon to dictate his overall recovery.    If you have any questions regarding this letter please feel free to contact our office.    Kindest Regards,       CORTEZ SimmonsC

## 2022-11-09 ENCOUNTER — CLINICAL SUPPORT (OUTPATIENT)
Dept: REHABILITATION | Facility: HOSPITAL | Age: 42
End: 2022-11-09
Attending: ORTHOPAEDIC SURGERY
Payer: MEDICAID

## 2022-11-09 DIAGNOSIS — M54.17 RADICULAR PAIN OF LUMBOSACRAL REGION: ICD-10-CM

## 2022-11-09 DIAGNOSIS — R26.9 ABNORMAL GAIT: ICD-10-CM

## 2022-11-09 DIAGNOSIS — M51.36 DDD (DEGENERATIVE DISC DISEASE), LUMBAR: ICD-10-CM

## 2022-11-09 PROCEDURE — 97161 PT EVAL LOW COMPLEX 20 MIN: CPT | Mod: PN

## 2022-11-09 NOTE — PROGRESS NOTES
[facility]  Physical Therapy Initial Evaluation    Name: Jarrod Lopez  Clinic Number: [id number]    Therapy Diagnosis:   Encounter Diagnoses   Name Primary?    DDD (degenerative disc disease), lumbar     Radicular pain of lumbosacral region     Abnormal gait      Physician: Chas Mccarty MD    Physician Orders: PT Eval and Treat   Medical Diagnosis from Referral: M51.36 (ICD-10-CM) - DDD (degenerative disc disease), lumbar  Evaluation Date: 11/9/2022  Authorization Period Expiration: 12/31/2022  Plan of Care Expiration: 1/30/3023 or 12v post eval  Visit # (total) / Visits authorized: 1/ eval (POC 0/12)  2nd FOTO visit 5  3rd FOTO visit 10  Progress Note Due: 12/9/2022    Time In: 300  Time Out: 355  Total Billable Time: 55 minutes    Precautions: HTN  Insurance: Payor: MEDICAID / Plan: [facility] ([employer]AC[employer]) / Product Type: Managed Medicaid /     Subjective   Date of onset: March 2022  History of current condition - Jarrod reports: in March, 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 Started having pain in back and shoulders. Tried chiropractor. Then worked in [place] and had a [place] contract. Lots of driving for work created numbness on front of thigh. Mornings, he has pain with getting up. In the past, LBP on and off, not much. No history of back surgery. Has to do 6 weeks of PT before getting MRI.    Medical History:   Past Medical History:   Diagnosis Date    Hypertension        Surgical History:   Jarrod Lopez  has no past surgical history on file.    Medications:   Jarrod has a current medication list which includes the following prescription(s): amlodipine, ergocalciferol, losartan, meloxicam, and tramadol.    Allergies:   Review of patient's allergies indicates:  No Known Allergies     Imaging, Xrays in Epic  Impression:   L2-L3 moderate disc disease.   Grade 1 L3-L4 retrolisthesis.   Moderate lower lumbar spine facet  arthropathy.    Prior Therapy: yes  Social History: living with someone; 1-story home  Current Smoker: none  Recent Weight loss: none  Bowel or Bladder Issues: now constipated  Falls in last 6 months: 3 falls on the job; fell down and going up stairs  Cancer Hx: none  Occupation: unemployed; typically work assembly contracts  Prior Level of Function: independent prior to June; currently, cane occasionally  Current Level of Function: difficult/pain with dressing; bathing; working; driving long distances; chores; standing long period times    Pain:  Current 3/10, worst 10/10, best 0/10 with medication  Location: mid back, glutes, lateral thigh down to lower legs; n/t in thighs  Description: aching, shooting, n/t  Aggravating Factors: dressing; bathing; working; driving long distances; chores; standing long period times  Easing Factors: pn medication, ice bath, heating pad    Pts goals: get the MRI, reduce pain, lose weight, back to work    Objective     Posture / IC / ASIS / PSIS:  high BMI; SEVERE Bilateral genu valgum; decreased lordosis; mod kyphosis and FHP  Palpation: TTP Bilateral paraspinals, Quadratus Lumborum, glutes    Gait Without AD   Analysis Antalgic; SEVERE genu valgum; Bilateral trendelenburg     Lumbar AROM: ROM-   Response to repeated movements   Flexion 0 cm from floor - stretch   Extension (15 norm) 10 degrees - pn   Right side bending  (20 norm) 25 degrees   Left side bending  (20 norm) 21 degrees - pn L   Rotation Observation WNL and symmetrical     Sensation: diminished on Right Lower Extremity upper and lower leg      L/E MMT: 5/5 Left Right   Hip Flexion 4+/5. 4/5.   Hip Extension 4+/5. 4/5.             Hip IR 4+/5. 4+/5.   Hip ER 4/5. 3+/5.   Knee Flexion 4+/5. 4/5.   Knee Extension 4+/5. Severe crepitus 4/5. Severe crepitus   Ankle DF 5/5. 5/5.   Ankle INV 5/5. 5/5.   Ankle EV 5/5. 3+/5.       Hip and LE Range of Motion:   In degrees Left  WNL except External Rotation is moderate  restriction Right  WNL except External Rotation is moderate restriction     Core strength MMT: 2/5 = unable to assume test position  LLD (Leg length discrepancy) / Supine to Sit: even today    Flexibility Left Right   Hamstrings 90/90 (-20 norm) -20 degrees -20 degrees     Flexibility: mild / moderate / severe restriction grading  Quadriceps: severe Bilateral   Illiopsoas: severe Bilateral   Paraspinals: severe Bilateral             TREATMENT   Treatment Time In: 335  Treatment Time Out: 355  Total Treatment time separate from Evaluation: 20 minutes    Jarrod received therapeutic exercises to develop strength, flexibility, posture, and core stabilization for 20 minutes including:    NOTE: Grade 1 L3-4 RETROLISTHESIS: Vertebra has slipped backwards & SEVERE Bilateral knee genu valgum  Presents with: Dec l/s lordosis and excessive Post Pelvic Tilt  Want to: Promote Anterior Pelvic Tilt    TABLE:  Abdominal bracing, 20x, 5sec (NO Posterior pelvic tilt for retrolisthesis)  Red Theraband Sidelying Clamshells, 20x Bilateral  Piriformis stretch, [id number] Bilateral   Barney stretch, [id number] Bilateral   Prone forearm stretch, [id number]     Add NEXT:  Mechanical Lumbar traction: 100# / 50# / 30 / 10 / 15 min  Bridge with Red Theraband above knees to avoid medial collapse of knees, 20-30x  Supine Modified obliques, tap opp hand to opp knee, 20x   Prone quad stretch with strap, 3x30s   Cat cow, 10x  Cable Column Paloff Press, 7#, 20-30x Bilateral   Core stab with strap, 3x, 30sec pulling in multi direction (on Physioball?)  Core stab with wand at edge of mat, 3x, 30sec  Single gastroc stretch on stairs, 3x30s Bilateral   Myofascial Release therabar to glutes and Iliotibial Band  Cardio for weight loss: Stationary Bike? Too painful? / If stationary bike painful, just do cardio on arm bike, 5-10 minutes (NO Elliptical for genu valgum!)  FUTURE:  Red Theraband Monster walks, Hallway, 1 set  Red Theraband Hip circumduction?, Hallway, 1  set  Prone Hip extension  Progress quad position therex      Home Exercises and Patient Education Provided    Education provided:   - daily HEP  - Cardio program to promote weight loss: (Swim, Walk, or Stationary Bike) Every other day; 5 minute start; increase by 5 minute increments; up to 30 minute goal   - utilizing heat in mornings, 10-20 minute max; ice if flare-up, 10-20 minute max     Written Home Exercises Provided: yes.  Exercises were reviewed and Jarrod was able to demonstrate them prior to the end of the session.  Jarrod demonstrated good  understanding of the education provided.     See EMR under Patient Instructions for exercises provided 11/9/2022.    Assessment   Jarrod is a 42 y.o. male referred to outpatient Physical Therapy with a medical diagnosis of DDD of lumbar spine. Pt presents with pain palpable in paraspinal, Quadratus Lumborum and glute regions, radicular pain in both extremities, obesity, severe genu valgum of Bilateral knees, decreased lumbar extension, decreased core, hip and Bilateral Lower Extremity strength, decreased hip flexor flexibility and posture, gait, and balnce deficits. These deficits contribute to patient pain and dysfunction.   Pt prognosis is Fair.   Pt will benefit from skilled outpatient Physical Therapy to address the deficits stated above and in the chart below, provide pt/family education, and to maximize pt's level of independence.     Plan of care discussed with patient: Yes  Pt's spiritual, cultural and educational needs considered and patient is agreeable to the plan of care and goals as stated below:     Anticipated Barriers for therapy: genu valgum, BMI    Medical Necessity is demonstrated by the following  History  Co-morbidities and personal factors that may impact the plan of care Co-morbidities:   HTN; difficulty sleeping; high BMI    Personal Factors:   coping style  lifestyle     low   Examination  Body Structures and Functions, activity limitations and  participation restrictions that may impact the plan of care Body Regions:   back  lower extremities  trunk    Body Systems:    gross symmetry  ROM  strength  balance  gait  motor control  motor learning  blood pressure  posture    Participation Restrictions:   ADLs, chores, community, work    Activity limitations:   Learning and applying knowledge  no deficits    General Tasks and Commands  no deficits    Communication  no deficits    Mobility  lifting and carrying objects  walking  driving (bike, car, motorcycle)    Self care  washing oneself (bathing, drying, washing hands)  dressing    Domestic Life  doing house work (cleaning house, washing dishes, laundry)  assisting others    Interactions/Relationships  no deficits    Life Areas  employment    Community and Social Life  community life  recreation and leisure         moderate   Clinical Presentation evolving clinical presentation with changing clinical characteristics moderate   Decision Making/ Complexity Score: low     Goals:  Short Term GOALS: 3 weeks  1. Patient is independent with HEP.   2. Patient demonstrates independence with core activation and postural awareness while sitting and standing.   3. Patient will reduce pn to 2/10 while performing daily activities.  4. Patient will reduce LE radiculopathy frequency and intensity.     Long Term GOALS: 6 weeks.   1. Patient demonstrates increased l/s extension ROM to 15 degrees.   2. Patient demonstrates increased core, hip and BLE strength by 1/2 grade or greater to improve tolerance to home chores.  3. Patient demonstrates independence with body mechanics while working.  4. Patient will improve FOTO limitation score to </= 41% to show improvement in condition and functional mobility.     Plan   Plan of care Certification: 11/9/2022 to 1/30/2023.    Outpatient Physical Therapy 2 times weekly for 6 weeks to include the following interventions: Cervical/Lumbar Traction, Electrical Stimulation ,, Gait Training,  Manual Therapy, Moist Heat/ Ice, Neuromuscular Re-ed, Orthotic Management and Training, Patient Education, Self Care, Therapeutic Activities, Therapeutic Exercise, and Ultrasound.     Jacqueline Simons, PT

## 2022-11-10 NOTE — PLAN OF CARE
[facility]  Physical Therapy Initial Evaluation    Name: Jarrod Lopez  Clinic Number: [id number]    Therapy Diagnosis:   Encounter Diagnoses   Name Primary?    DDD (degenerative disc disease), lumbar     Radicular pain of lumbosacral region     Abnormal gait      Physician: Chas Mccarty MD    Physician Orders: PT Eval and Treat   Medical Diagnosis from Referral: M51.36 (ICD-10-CM) - DDD (degenerative disc disease), lumbar  Evaluation Date: 11/9/2022  Authorization Period Expiration: 12/31/2022  Plan of Care Expiration: 1/30/3023 or 12v post eval  Visit # (total) / Visits authorized: 1/ eval (POC 0/12)  2nd FOTO visit 5  3rd FOTO visit 10  Progress Note Due: 12/9/2022    Time In: 300  Time Out: 355  Total Billable Time: 55 minutes    Precautions: HTN  Insurance: Payor: MEDICAID / Plan: [facility] (LACARE) / Product Type: Managed Medicaid /     Subjective   Date of onset: March 2022  History of current condition - Jarrod reports: in March, 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 Started having pain in back and shoulders. Tried chiropractor. Then worked in [place] and had a [place] contract. Lots of driving for work created numbness on front of thigh. Mornings, he has pain with getting up. In the past, LBP on and off, not much. No history of back surgery. Has to do 6 weeks of PT before getting MRI.    Medical History:   Past Medical History:   Diagnosis Date    Hypertension        Surgical History:   Jarrod Lopez  has no past surgical history on file.    Medications:   Jarrod has a current medication list which includes the following prescription(s): amlodipine, ergocalciferol, losartan, meloxicam, and tramadol.    Allergies:   Review of patient's allergies indicates:  No Known Allergies     Imaging, Xrays in Epic  Impression:   L2-L3 moderate disc disease.   Grade 1 L3-L4 retrolisthesis.   Moderate lower lumbar spine facet  arthropathy.    Prior Therapy: yes  Social History: living with someone; 1-story home  Current Smoker: none  Recent Weight loss: none  Bowel or Bladder Issues: now constipated  Falls in last 6 months: 3 falls on the job; fell down and going up stairs  Cancer Hx: none  Occupation: unemployed; typically work assembly contracts  Prior Level of Function: independent prior to June; currently, cane occasionally  Current Level of Function: difficult/pain with dressing; bathing; working; driving long distances; chores; standing long period times    Pain:  Current 3/10, worst 10/10, best 0/10 with medication  Location: mid back, glutes, lateral thigh down to lower legs; n/t in thighs  Description: aching, shooting, n/t  Aggravating Factors: dressing; bathing; working; driving long distances; chores; standing long period times  Easing Factors: pn medication, ice bath, heating pad    Pts goals: get the MRI, reduce pain, lose weight, back to work    Objective     Posture / IC / ASIS / PSIS:  high BMI; SEVERE Bilateral genu valgum; decreased lordosis; mod kyphosis and FHP  Palpation: TTP Bilateral paraspinals, Quadratus Lumborum, glutes    Gait Without AD   Analysis Antalgic; SEVERE genu valgum; Bilateral trendelenburg     Lumbar AROM: ROM-   Response to repeated movements   Flexion 0 cm from floor - stretch   Extension (15 norm) 10 degrees - pn   Right side bending  (20 norm) 25 degrees   Left side bending  (20 norm) 21 degrees - pn L   Rotation Observation WNL and symmetrical     Sensation: diminished on Right Lower Extremity upper and lower leg      L/E MMT: 5/5 Left Right   Hip Flexion 4+/5. 4/5.   Hip Extension 4+/5. 4/5.             Hip IR 4+/5. 4+/5.   Hip ER 4/5. 3+/5.   Knee Flexion 4+/5. 4/5.   Knee Extension 4+/5. Severe crepitus 4/5. Severe crepitus   Ankle DF 5/5. 5/5.   Ankle INV 5/5. 5/5.   Ankle EV 5/5. 3+/5.       Hip and LE Range of Motion:   In degrees Left  WNL except External Rotation is moderate  restriction Right  WNL except External Rotation is moderate restriction     Core strength MMT: 2/5 = unable to assume test position  LLD (Leg length discrepancy) / Supine to Sit: even today    Flexibility Left Right   Hamstrings 90/90 (-20 norm) -20 degrees -20 degrees     Flexibility: mild / moderate / severe restriction grading  Quadriceps: severe Bilateral   Illiopsoas: severe Bilateral   Paraspinals: severe Bilateral             TREATMENT   Treatment Time In: 335  Treatment Time Out: 355  Total Treatment time separate from Evaluation: 20 minutes    Jarrod received therapeutic exercises to develop strength, flexibility, posture, and core stabilization for 20 minutes including:    NOTE: Grade 1 L3-4 RETROLISTHESIS: Vertebra has slipped backwards & SEVERE Bilateral knee genu valgum  Presents with: Dec l/s lordosis and excessive Post Pelvic Tilt  Want to: Promote Anterior Pelvic Tilt    TABLE:  Abdominal bracing, 20x, 5sec (NO Posterior pelvic tilt for retrolisthesis)  Red Theraband Sidelying Clamshells, 20x Bilateral  Piriformis stretch, [id number] Bilateral   Barney stretch, [id number] Bilateral   Prone forearm stretch, [id number]     Add NEXT:  Mechanical Lumbar traction: 100# / 50# / 30 / 10 / 15 min  Bridge with Red Theraband above knees to avoid medial collapse of knees, 20-30x  Supine Modified obliques, tap opp hand to opp knee, 20x   Prone quad stretch with strap, 3x30s   Cat cow, 10x  Cable Column Paloff Press, 7#, 20-30x Bilateral   Core stab with strap, 3x, 30sec pulling in multi direction (on Physioball?)  Core stab with wand at edge of mat, 3x, 30sec  Single gastroc stretch on stairs, 3x30s Bilateral   Myofascial Release therabar to glutes and Iliotibial Band  Cardio for weight loss: Stationary Bike? Too painful? / If stationary bike painful, just do cardio on arm bike, 5-10 minutes (NO Elliptical for genu valgum!)  FUTURE:  Red Theraband Monster walks, Hallway, 1 set  Red Theraband Hip circumduction?, Hallway, 1  set  Prone Hip extension  Progress quad position therex      Home Exercises and Patient Education Provided    Education provided:   - daily HEP  - Cardio program to promote weight loss: (Swim, Walk, or Stationary Bike) Every other day; 5 minute start; increase by 5 minute increments; up to 30 minute goal   - utilizing heat in mornings, 10-20 minute max; ice if flare-up, 10-20 minute max     Written Home Exercises Provided: yes.  Exercises were reviewed and Jarrod was able to demonstrate them prior to the end of the session.  Jarrod demonstrated good  understanding of the education provided.     See EMR under Patient Instructions for exercises provided 11/9/2022.    Assessment   Jarrod is a 42 y.o. male referred to outpatient Physical Therapy with a medical diagnosis of DDD of lumbar spine. Pt presents with pain palpable in paraspinal, Quadratus Lumborum and glute regions, radicular pain in both extremities, obesity, severe genu valgum of Bilateral knees, decreased lumbar extension, decreased core, hip and Bilateral Lower Extremity strength, decreased hip flexor flexibility and posture, gait, and balnce deficits. These deficits contribute to patient pain and dysfunction.   Pt prognosis is Fair.   Pt will benefit from skilled outpatient Physical Therapy to address the deficits stated above and in the chart below, provide pt/family education, and to maximize pt's level of independence.     Plan of care discussed with patient: Yes  Pt's spiritual, cultural and educational needs considered and patient is agreeable to the plan of care and goals as stated below:     Anticipated Barriers for therapy: genu valgum, BMI    Medical Necessity is demonstrated by the following  History  Co-morbidities and personal factors that may impact the plan of care Co-morbidities:   HTN; difficulty sleeping; high BMI    Personal Factors:   coping style  lifestyle     low   Examination  Body Structures and Functions, activity limitations and  participation restrictions that may impact the plan of care Body Regions:   back  lower extremities  trunk    Body Systems:    gross symmetry  ROM  strength  balance  gait  motor control  motor learning  blood pressure  posture    Participation Restrictions:   ADLs, chores, community, work    Activity limitations:   Learning and applying knowledge  no deficits    General Tasks and Commands  no deficits    Communication  no deficits    Mobility  lifting and carrying objects  walking  driving (bike, car, motorcycle)    Self care  washing oneself (bathing, drying, washing hands)  dressing    Domestic Life  doing house work (cleaning house, washing dishes, laundry)  assisting others    Interactions/Relationships  no deficits    Life Areas  employment    Community and Social Life  community life  recreation and leisure         moderate   Clinical Presentation evolving clinical presentation with changing clinical characteristics moderate   Decision Making/ Complexity Score: low     Goals:  Short Term GOALS: 3 weeks  1. Patient is independent with HEP.   2. Patient demonstrates independence with core activation and postural awareness while sitting and standing.   3. Patient will reduce pn to 2/10 while performing daily activities.  4. Patient will reduce LE radiculopathy frequency and intensity.     Long Term GOALS: 6 weeks.   1. Patient demonstrates increased l/s extension ROM to 15 degrees.   2. Patient demonstrates increased core, hip and BLE strength by 1/2 grade or greater to improve tolerance to home chores.  3. Patient demonstrates independence with body mechanics while working.  4. Patient will improve FOTO limitation score to </= 41% to show improvement in condition and functional mobility.     Plan   Plan of care Certification: 11/9/2022 to 1/30/2023.    Outpatient Physical Therapy 2 times weekly for 6 weeks to include the following interventions: Cervical/Lumbar Traction, Electrical Stimulation ,, Gait Training,  Manual Therapy, Moist Heat/ Ice, Neuromuscular Re-ed, Orthotic Management and Training, Patient Education, Self Care, Therapeutic Activities, Therapeutic Exercise, and Ultrasound.     Jacqueline Simons, PT

## 2022-11-16 ENCOUNTER — OFFICE VISIT (OUTPATIENT)
Dept: FAMILY MEDICINE | Facility: CLINIC | Age: 42
End: 2022-11-16
Payer: MEDICAID

## 2022-11-16 VITALS
HEIGHT: 71 IN | HEART RATE: 81 BPM | TEMPERATURE: 99 F | OXYGEN SATURATION: 95 % | BODY MASS INDEX: 42.56 KG/M2 | WEIGHT: 304 LBS

## 2022-11-16 DIAGNOSIS — U07.1 COVID-19: ICD-10-CM

## 2022-11-16 DIAGNOSIS — J06.9 UPPER RESPIRATORY TRACT INFECTION, UNSPECIFIED TYPE: Primary | ICD-10-CM

## 2022-11-16 DIAGNOSIS — U07.1 COVID-19 VIRUS DETECTED: ICD-10-CM

## 2022-11-16 LAB
CTP QC/QA: YES
CTP QC/QA: YES
POC MOLECULAR INFLUENZA A AGN: NEGATIVE
POC MOLECULAR INFLUENZA B AGN: NEGATIVE
SARS-COV-2 RDRP RESP QL NAA+PROBE: POSITIVE

## 2022-11-16 PROCEDURE — 99214 OFFICE O/P EST MOD 30 MIN: CPT | Mod: S$PBB,,, | Performed by: NURSE PRACTITIONER

## 2022-11-16 PROCEDURE — 99214 PR OFFICE/OUTPT VISIT, EST, LEVL IV, 30-39 MIN: ICD-10-PCS | Mod: S$PBB,,, | Performed by: NURSE PRACTITIONER

## 2022-11-16 PROCEDURE — 3008F PR BODY MASS INDEX (BMI) DOCUMENTED: ICD-10-PCS | Mod: CPTII,,, | Performed by: NURSE PRACTITIONER

## 2022-11-16 PROCEDURE — 99213 OFFICE O/P EST LOW 20 MIN: CPT | Performed by: NURSE PRACTITIONER

## 2022-11-16 PROCEDURE — 87635 SARS-COV-2 COVID-19 AMP PRB: CPT | Mod: PBBFAC | Performed by: NURSE PRACTITIONER

## 2022-11-16 PROCEDURE — 3008F BODY MASS INDEX DOCD: CPT | Mod: CPTII,,, | Performed by: NURSE PRACTITIONER

## 2022-11-16 PROCEDURE — 4010F PR ACE/ARB THEARPY RXD/TAKEN: ICD-10-PCS | Mod: CPTII,,, | Performed by: NURSE PRACTITIONER

## 2022-11-16 PROCEDURE — 87502 INFLUENZA DNA AMP PROBE: CPT | Mod: PBBFAC | Performed by: NURSE PRACTITIONER

## 2022-11-16 PROCEDURE — 4010F ACE/ARB THERAPY RXD/TAKEN: CPT | Mod: CPTII,,, | Performed by: NURSE PRACTITIONER

## 2022-11-16 NOTE — PROGRESS NOTES
Patient ID: Jarrod Lopez is a 42 y.o. male.    Chief Complaint: No chief complaint on file.    MR. Lopez came in to be evaluated for upper respiratory symptoms. He states he started feeling sick on yesterday and his room mate tested positive for flu earlier this week. He states his symptoms are body ache, chills,  cough and fever. He just feels unwell. He is not in any respiratory distress. He denies an other issues or complaints.     URI   This is a new problem. The current episode started yesterday. The problem has been gradually worsening. There has been no fever. Associated symptoms include congestion, coughing, sinus pain and a sore throat. Pertinent negatives include no abdominal pain, chest pain, diarrhea, headaches, nausea, rash, rhinorrhea, vomiting or wheezing. He has tried nothing for the symptoms. The treatment provided no relief.         Past Medical History:   Diagnosis Date    Hypertension      No past surgical history on file.      Tobacco History:  reports that he has never smoked. He has never used smokeless tobacco.      Review of patient's allergies indicates:  No Known Allergies    Current Outpatient Medications:     amLODIPine (NORVASC) 10 MG tablet, Take 1 tablet (10 mg total) by mouth once daily., Disp: 30 tablet, Rfl: 3    losartan (COZAAR) 50 MG tablet, Take 1 tablet (50 mg total) by mouth once daily., Disp: 90 tablet, Rfl: 3    molnupiravir 200 mg capsule (EUA), Take 4 capsules (800 mg total) by mouth every 12 (twelve) hours. for 5 days, Disp: 40 capsule, Rfl: 0    traMADoL (ULTRAM) 50 mg tablet, Take 1 tablet (50 mg total) by mouth every 12 (twelve) hours as needed for Pain., Disp: 30 tablet, Rfl: 0    Review of Systems   Constitutional:  Negative for activity change, appetite change, fatigue and fever.   HENT:  Positive for congestion, sinus pain and sore throat. Negative for dental problem, nosebleeds, postnasal drip, rhinorrhea and tinnitus.    Eyes:  Negative for pain, discharge,  "itching and visual disturbance.   Respiratory:  Positive for cough. Negative for chest tightness, shortness of breath and wheezing.    Cardiovascular:  Negative for chest pain.   Gastrointestinal:  Negative for abdominal pain, blood in stool, constipation, diarrhea, nausea and vomiting.   Endocrine: Negative for cold intolerance, heat intolerance, polydipsia, polyphagia and polyuria.   Genitourinary:  Negative for difficulty urinating, flank pain, genital sores, hematuria and urgency.   Musculoskeletal:  Negative for arthralgias and myalgias.   Skin:  Negative for rash and wound.   Allergic/Immunologic: Negative for environmental allergies and food allergies.   Neurological:  Negative for dizziness, light-headedness and headaches.   Hematological:  Negative for adenopathy. Does not bruise/bleed easily.   Psychiatric/Behavioral:  Negative for behavioral problems, confusion, decreased concentration, sleep disturbance and suicidal ideas. The patient is not nervous/anxious and is not hyperactive.         Objective:      Vitals:    11/16/22 1503   BP: (P) 132/80   Pulse: 81   Temp: 98.7 °F (37.1 °C)   TempSrc: Oral   SpO2: 95%   Weight: (!) 137.9 kg (304 lb)   Height: 5' 11" (1.803 m)     Physical Exam  Vitals reviewed.   Constitutional:       General: He is not in acute distress.     Appearance: Normal appearance. He is obese. He is ill-appearing. He is not toxic-appearing or diaphoretic.   HENT:      Head: Normocephalic and atraumatic.      Right Ear: Tympanic membrane and external ear normal. There is no impacted cerumen.      Left Ear: Tympanic membrane and external ear normal. There is no impacted cerumen.      Nose: Nose normal. No congestion or rhinorrhea.      Mouth/Throat:      Mouth: Mucous membranes are moist.      Pharynx: Oropharynx is clear. No oropharyngeal exudate or posterior oropharyngeal erythema.   Eyes:      General: No scleral icterus.        Right eye: No discharge.         Left eye: No discharge. "      Extraocular Movements: Extraocular movements intact.      Conjunctiva/sclera: Conjunctivae normal.      Pupils: Pupils are equal, round, and reactive to light.   Cardiovascular:      Rate and Rhythm: Normal rate and regular rhythm.      Pulses: Normal pulses.      Heart sounds: Normal heart sounds. No murmur heard.    No friction rub. No gallop.   Pulmonary:      Effort: Pulmonary effort is normal. No respiratory distress.      Breath sounds: Normal breath sounds. No wheezing, rhonchi or rales.   Chest:      Chest wall: No tenderness.   Abdominal:      General: Abdomen is flat. Bowel sounds are normal.      Palpations: Abdomen is soft. There is no mass.      Tenderness: There is no abdominal tenderness. There is no guarding or rebound.   Musculoskeletal:         General: No swelling or signs of injury. Normal range of motion.      Cervical back: Normal range of motion and neck supple. No rigidity or tenderness.      Right lower leg: No edema.      Left lower leg: No edema.   Skin:     General: Skin is warm and dry.      Capillary Refill: Capillary refill takes less than 2 seconds.      Coloration: Skin is not pale.      Findings: No bruising or erythema.   Neurological:      General: No focal deficit present.      Mental Status: He is alert and oriented to person, place, and time. Mental status is at baseline.      Motor: No weakness.      Coordination: Coordination normal.      Gait: Gait normal.   Psychiatric:         Mood and Affect: Mood normal.         Behavior: Behavior normal.         Thought Content: Thought content normal.         Judgment: Judgment normal.         Assessment:       1. Upper respiratory tract infection, unspecified type    2. COVID-19           Plan:       Upper respiratory tract infection, unspecified type  -     POCT Influenza A/B Molecular  -     POCT COVID-19 Rapid Screening    COVID-19  -     molnupiravir 200 mg capsule (EUA); Take 4 capsules (800 mg total) by mouth every 12  (twelve) hours. for 5 days  Dispense: 40 capsule; Refill: 0      Follow up if symptoms worsen or fail to improve.        11/16/2022 Mirella Cleaning, NP

## 2022-11-16 NOTE — LETTER
[address] [facility] [address]  Phone: [phone]  Fax: [phone]          Return to Work/School    Patient: Jarrod Lopez  YOB: 1980   Date: 11/16/2022     To Whom It May Concern:     Jarrod Lopez was in contact with/seen in my office on 11/16/2022. COVID-19 is present in our communities across the state. There is limited testing for COVID at this time, so not all patients can be tested. In this situation, your employee meets the following criteria:     Jarrod Lopez has met the criteria for COVID-19 testing and has a POSITIVE result. He can return to work once they are asymptomatic for 24 hours without the use of fever reducing medications AND at least five days from the start of symptoms (or from the first positive result if they have no symptoms).     At the earliest 1121/2022.     If you have any questions or concerns, or if I can be of further assistance, please do not hesitate to contact me.     Sincerely,            EVON Bryant

## 2022-11-28 ENCOUNTER — CLINICAL SUPPORT (OUTPATIENT)
Dept: REHABILITATION | Facility: HOSPITAL | Age: 42
End: 2022-11-28
Attending: ORTHOPAEDIC SURGERY
Payer: MEDICAID

## 2022-11-28 DIAGNOSIS — M54.17 RADICULAR PAIN OF LUMBOSACRAL REGION: Primary | ICD-10-CM

## 2022-11-28 DIAGNOSIS — R26.9 ABNORMAL GAIT: ICD-10-CM

## 2022-11-28 PROCEDURE — 97012 MECHANICAL TRACTION THERAPY: CPT | Mod: PN,CQ

## 2022-11-28 PROCEDURE — 97110 THERAPEUTIC EXERCISES: CPT | Mod: PN,CQ

## 2022-11-28 NOTE — PROGRESS NOTES
PT/PTA met face to face to discuss pt's treatment plan and progress towards established goals. Pt will be seen by a physical therapist minimally every 6th visit or every 30 days.        Leigh Cuello PTA

## 2022-11-28 NOTE — PROGRESS NOTES
"[facility]   Physical Therapy Treatment Note     Name: Jarrod Lopez  [facility] Number: [id number]    Therapy Diagnosis:   Encounter Diagnoses   Name Primary?    Radicular pain of lumbosacral region Yes    Abnormal gait      Physician: Chas Mccarty MD    Visit Date: 11/28/2022    Physician Orders: PT Eval and Treat   Medical Diagnosis from Referral: M51.36 (ICD-10-CM) - DDD (degenerative disc disease), lumbar  Evaluation Date: 11/9/2022  Authorization Period Expiration: 12/31/2022  Plan of Care Expiration: 1/30/3023 or 12v post eval  Visit # (total) / Visits authorized: 1/ eval (POC 0/12)  PTA Visit #: 1/5     FOTO   Eval-   visit 5  3rd FOTO visit 10  Progress Note Due: 12/9/2022      Time In: 0801  Time Out: 0905  Total Billable Time: 64 minutes    SUBJECTIVE     Pt reports: "Hurting as usual"  He was compliant with home exercise program.  Response to previous treatment: no complaints  Functional change: none reported    Pain: 7/10  Location: bilateral lower legs and upper legs     OBJECTIVE     Objective Measures updated at progress report unless specified.     Treatment     Jarrod received the treatments listed below:      therapeutic exercises to develop strength, endurance, ROM, flexibility, posture, and core stabilization for 49 minutes including:    NOTE: Grade 1 L3-4 RETROLISTHESIS: Vertebra has slipped backwards & SEVERE Bilateral knee genu valgum  Presents with: Dec l/s lordosis and excessive Post Pelvic Tilt  Want to: Promote Anterior Pelvic Tilt     Seated exercises:  Hamstring stretch 3 x 30 seconds bilateral lower extremity     TABLE:  Abdominal bracing x 20 reps with 5 seconds  (NO Posterior pelvic tilt for retrolisthesis)  Red Theraband Sidelying Clamshells x 20 reps   Bridge with Red Theraband above knees to avoid medial collapse of knees x 20 reps   Hip adduction x 20 reps with ball  Piriformis stretch 3 x 30 seconds Bilateral lower extremity     Barney stretch  3 x 30 " "seconds Bilateral lower extremity (not performed)    Prone exercises:  forearm stretch 2' with over pressure over sacrum and SI joint  Pressups x 10 reps with pressure over sacral area  Hip extension with knee bent x 10 reps bilateral lower extremity  Alternating lower extremity/upper extremity x 10 reps     Sidelying exercises:  Open books x 10 reps bilateral      Patient received intermittent lumbar traction x 15 min with Moist Hot Pack  at 100# max/ 50# min, 30 seconds pull/ 10 seconds rest.      Add NEXT:  Supine Modified obliques, tap opp hand to opp knee, 20x   Prone quad stretch with strap, 3x30s   Cat cow, 10x  Cable Column Paloff Press, 7#, 20-30x Bilateral   Core stab with strap, 3x, 30sec pulling in multi direction (on Physioball?)  Core stab with wand at edge of mat, 3x, 30sec  Single gastroc stretch on stairs, 3x30s Bilateral   Myofascial Release therabar to glutes and Iliotibial Band  Cardio for weight loss: Stationary Bike? Too painful? / If stationary bike painful, just do cardio on arm bike, 5-10 minutes (NO Elliptical for genu valgum!)  FUTURE:  Red Theraband Monster walks, Hallway, 1 set  Red Theraband Hip circumduction?, Hallway, 1 set  Prone Hip extension  Progress quad position therex         Patient Education and Home Exercises     Home Exercises Provided and Patient Education Provided     Education provided:   - apply ice if delayed muscle soreness occurs    Written Home Exercises Provided: yes. Exercises were reviewed and Jarrod was able to demonstrate them prior to the end of the session.  Jarrod demonstrated good  understanding of the education provided. See EMR under Patient Instructions for exercises provided during therapy sessions    ASSESSMENT     Jarrod provided good effort and participation toward therapeutic interventions today with focus on lumbar range of motion/flexibility.  Patient reported hip adduction with ball "felt really good"      Jarrod Is progressing well towards his goals. "   Pt prognosis is Good.     Pt will continue to benefit from skilled outpatient physical therapy to address the deficits listed in the problem list box on initial evaluation, provide pt/family education and to maximize pt's level of independence in the home and community environment.     Pt's spiritual, cultural and educational needs considered and pt agreeable to plan of care and goals.     Anticipated barriers to physical therapy: genu valgum, BMI    Goals:   Short Term GOALS: 3 weeks  1. Patient is independent with HEP.   2. Patient demonstrates independence with core activation and postural awareness while sitting and standing.   3. Patient will reduce pn to 2/10 while performing daily activities.  4. Patient will reduce LE radiculopathy frequency and intensity.     Long Term GOALS: 6 weeks.   1. Patient demonstrates increased l/s extension ROM to 15 degrees.   2. Patient demonstrates increased core, hip and BLE strength by 1/2 grade or greater to improve tolerance to home chores.  3. Patient demonstrates independence with body mechanics while working.  4. Patient will improve FOTO limitation score to </= 41% to show improvement in condition and functional mobility.     PLAN     Plan of care Certification: 11/9/2022 to 1/30/2023.     Outpatient Physical Therapy 2 times weekly for 6 weeks to include the following interventions: Cervical/Lumbar Traction, Electrical Stimulation ,, Gait Training, Manual Therapy, Moist Heat/ Ice, Neuromuscular Re-ed, Orthotic Management and Training, Patient Education, Self Care, Therapeutic Activities, Therapeutic Exercise, and Ultrasound.     Continue with therapeutic exercises, stretching and modalities as needed to achieve goals per Plan of Care.    Leigh Cuello, PTA

## 2022-11-30 ENCOUNTER — CLINICAL SUPPORT (OUTPATIENT)
Dept: REHABILITATION | Facility: HOSPITAL | Age: 42
End: 2022-11-30
Attending: ORTHOPAEDIC SURGERY
Payer: MEDICAID

## 2022-11-30 DIAGNOSIS — R26.9 ABNORMAL GAIT: ICD-10-CM

## 2022-11-30 DIAGNOSIS — M54.17 RADICULAR PAIN OF LUMBOSACRAL REGION: Primary | ICD-10-CM

## 2022-11-30 PROCEDURE — 97110 THERAPEUTIC EXERCISES: CPT | Mod: PN,CQ

## 2022-11-30 NOTE — PROGRESS NOTES
[facility]   Physical Therapy Treatment Note     Name: Jarrod Lopez  Clinic Number: [id number]    Therapy Diagnosis:   Encounter Diagnoses   Name Primary?    Radicular pain of lumbosacral region Yes    Abnormal gait      Physician: Chas Mccarty MD    Visit Date: 11/30/2022    Physician Orders: PT Eval and Treat   Medical Diagnosis from Referral: M51.36 (ICD-10-CM) - DDD (degenerative disc disease), lumbar  Evaluation Date: 11/9/2022  Authorization Period Expiration: 12/31/2022  Plan of Care Expiration: 1/30/3023 or 12v post eval  Visit # (total) / Visits authorized: 1/ eval (POC 0/12)  PTA Visit #: 1/5     FOTO   Eval-   visit 5  3rd FOTO visit 10  Progress Note Due: 12/9/2022      Time In: 0810  Time Out: 0908  Total Billable Time: 58 minutes    SUBJECTIVE     Pt reports: pt stated he isn't hurting as bad but he took some ibuprofen earlier.  Pt did report pain in his lateral lower leg.  He was compliant with home exercise program.  Response to previous treatment: no complaints  Functional change: none reported    Pain: 3/10  Location: bilateral lower legs and upper legs     OBJECTIVE     Objective Measures updated at progress report unless specified.     Treatment     Jarrod received the treatments listed below:      therapeutic exercises to develop strength, endurance, ROM, flexibility, posture, and core stabilization for 58 minutes including:    NOTE: Grade 1 L3-4 RETROLISTHESIS: Vertebra has slipped backwards & SEVERE Bilateral knee genu valgum  Presents with: Dec l/s lordosis and excessive Post Pelvic Tilt  Want to: Promote Anterior Pelvic Tilt     Bike x 10' level 3 to increase cardio output prior to exercises.    Seated exercises:  Hamstring stretch 3 x 30 seconds bilateral lower extremity     TABLE:  Abdominal bracing x 20 reps with 5 seconds  (NO Posterior pelvic tilt for retrolisthesis)  Red Theraband Sidelying Clamshells x 20 reps   Bridge with ball between knees x 20 reps   Hip  adduction x 20 reps with ball   Piriformis stretch 3 x 30 seconds Bilateral lower extremity     Sidelying exercises:  Open books x 10 reps bilateral      Soft Tissue Mobs along Left anterior tib muscle/extensor digitorum longus x 5 min     Standing exercises:  Gastroc stretch 1 x 30 seconds bilateral lower extremity on step (pt experiencing Left lateral leg pain again.      Patient received intermittent lumbar traction x 15 min with Moist Hot Pack  at 100# max/ 50# min, 30 seconds pull/ 10 seconds rest.        Prone exercises: (not performed)  forearm stretch 2' with over pressure over sacrum and SI joint  Pressups x 10 reps with pressure over sacral area  Hip extension with knee bent x 10 reps bilateral lower extremity  Alternating lower extremity/upper extremity x 10 reps   Barney stretch  3 x 30 seconds Bilateral lower extremity (not performed)    Add NEXT:  Supine Modified obliques, tap opp hand to opp knee, 20x   Prone quad stretch with strap, 3x30s   Cat cow, 10x  Cable Column Paloff Press, 7#, 20-30x Bilateral   Core stab with strap, 3x, 30sec pulling in multi direction (on Physioball?)  Core stab with wand at edge of mat, 3x, 30sec  Single gastroc stretch on stairs, 3x30s Bilateral   Myofascial Release therabar to glutes and Iliotibial Band  Cardio for weight loss: Stationary Bike? Too painful? / If stationary bike painful, just do cardio on arm bike, 5-10 minutes (NO Elliptical for genu valgum!)  FUTURE:  Red Theraband Monster walks, Hallway, 1 set  Red Theraband Hip circumduction?, Hallway, 1 set  Prone Hip extension  Progress quad position therex         Patient Education and Home Exercises     Home Exercises Provided and Patient Education Provided     Education provided:   - apply ice if delayed muscle soreness occurs  - continue Home exercise program   - Instructed patient to perf Dorsiflexion stretch with internal rotation  to help stretch anterior tibialis muscle      Written Home Exercises Provided:  yes. Exercises were reviewed and Jarrod was able to demonstrate them prior to the end of the session.  Jarrod demonstrated good  understanding of the education provided. See EMR under Patient Instructions for exercises provided during therapy sessions    ASSESSMENT     Jarrod provided good effort and participation toward therapeutic interventions today with focus on lumbar range of motion/flexibility. Patient reported pain in his Left lateral lower extremity with bridging exercises.  Pain decreased after Soft Tissue Mobs.        Jarrod Is progressing well towards his goals.   Pt prognosis is Good.     Pt will continue to benefit from skilled outpatient physical therapy to address the deficits listed in the problem list box on initial evaluation, provide pt/family education and to maximize pt's level of independence in the home and community environment.     Pt's spiritual, cultural and educational needs considered and pt agreeable to plan of care and goals.     Anticipated barriers to physical therapy: genu valgum, BMI    Goals:   Short Term GOALS: 3 weeks  1. Patient is independent with HEP.   2. Patient demonstrates independence with core activation and postural awareness while sitting and standing.   3. Patient will reduce pn to 2/10 while performing daily activities.  4. Patient will reduce LE radiculopathy frequency and intensity.     Long Term GOALS: 6 weeks.   1. Patient demonstrates increased l/s extension ROM to 15 degrees.   2. Patient demonstrates increased core, hip and BLE strength by 1/2 grade or greater to improve tolerance to home chores.  3. Patient demonstrates independence with body mechanics while working.  4. Patient will improve FOTO limitation score to </= 41% to show improvement in condition and functional mobility.     PLAN     Plan of care Certification: 11/9/2022 to 1/30/2023.     Outpatient Physical Therapy 2 times weekly for 6 weeks to include the following interventions: Cervical/Lumbar  Traction, Electrical Stimulation ,, Gait Training, Manual Therapy, Moist Heat/ Ice, Neuromuscular Re-ed, Orthotic Management and Training, Patient Education, Self Care, Therapeutic Activities, Therapeutic Exercise, and Ultrasound.     Continue with therapeutic exercises, stretching and modalities as needed to achieve goals per Plan of Care.    Leigh Cuello, PTA

## 2022-12-06 ENCOUNTER — CLINICAL SUPPORT (OUTPATIENT)
Dept: REHABILITATION | Facility: HOSPITAL | Age: 42
End: 2022-12-06
Attending: ORTHOPAEDIC SURGERY
Payer: MEDICAID

## 2022-12-06 DIAGNOSIS — R26.9 ABNORMAL GAIT: ICD-10-CM

## 2022-12-06 DIAGNOSIS — M54.17 RADICULAR PAIN OF LUMBOSACRAL REGION: Primary | ICD-10-CM

## 2022-12-06 PROCEDURE — 97110 THERAPEUTIC EXERCISES: CPT | Mod: PN

## 2022-12-06 NOTE — PROGRESS NOTES
[facility]   Physical Therapy Treatment Note     Name: Jarrod Lopez  [facility] Number: [id number]    Therapy Diagnosis:   Encounter Diagnoses   Name Primary?    Radicular pain of lumbosacral region Yes    Abnormal gait        Physician: Chas Mccarty MD    Visit Date: 12/6/2022    Physician Orders: PT Eval and Treat   Medical Diagnosis from Referral: M51.36 (ICD-10-CM) - DDD (degenerative disc disease), lumbar  Evaluation Date: 11/9/2022  Authorization Period Expiration: 12/31/2022  Plan of Care Expiration: 1/30/3023 or 12v post eval  Visit # (total) / Visits authorized: 4/ eval + 12 (POC 3/12)  PTA Visit #: 1/5     FOTO   Eval-   visit 5  3rd FOTO visit 10  Progress Note Due: 12/9/2022      Time In: 830  Time Out: 925  Total Billable Time: 55 minutes    SUBJECTIVE     Pt reports: pain with cooking. Different type of pain now; less numbness, more feeling. Always on pn med when he comes, not on it today.  .  He was compliant with home exercise program.  Response to previous treatment: no complaints  Functional change: none reported    Pain: 5/10  Location: bilateral lower legs and upper legs     OBJECTIVE     Objective Measures updated at progress report unless specified.     Treatment     NOTE: Grade 1 L3-4 RETROLISTHESIS: Vertebra has slipped backwards & SEVERE Bilateral knee genu valgum  Presents with: Dec l/s lordosis and excessive Post Pelvic Tilt  Want to: Promote Anterior Pelvic Tilt    therapeutic exercises to develop strength, endurance, ROM, flexibility, posture, and core stabilization for 55 minutes including:  ALL INTERVENTIONS BILLED AS THEREX PER MEDICAID GUIDELINES    Patient received intermittent mechanical lumbar traction x 15 min with Moist Hot Pack  at 100# max/ 50# min, 30 seconds pull/ 10 seconds rest.      Bike x 10' level 3 to increase cardio output and promote weight loss prior to exercises - NOT PERFORMED time    TABLE:  Sidelying Open books x 10 reps bilateral    Abdominal bracing x 20 reps with 5 seconds  (NO Posterior pelvic tilt for retrolisthesis)  +Supine Modified obliques, brace abs and tap opp hand to opp knee, 20x  Red Theraband Sidelying Clamshells x 20 reps   Red Theraband Bridge with hip abduction x 20 reps (to counteract genu valgum)  Piriformis stretch with strap, 2 x 30 seconds Bilateral lower extremity   Prone Press ups x 10 reps with pressure over sacral area - no pressure today  Prone Hip extension, hands stacked, x 10 reps, 2 sets bilateral lower extremity  +Prone quad stretch with strap, 2x30s, Bilateral    +Quad Cat cow, 10x  +Seated edge of mat, Core stab with wand, 3x, 30sec    STAND:  Gastroc stretch 2 x 30 seconds, Left lower extremity on step - NOT PERFORMED time  Add next Cable Column Paloff Press, 7#, 20-30x Bilateral     Manual tx:   Soft Tissue Mobs along Left anterior tib muscle/extensor digitorum longus - NOT PERFORMED   Myofascial Release therabar to Bilateral glutes, Iliotibial Band, and left peroneals       Add NEXT:  Core stab with strap, 3x, 30sec pulling in multi direction (on Physioball?)  FUTURE:  Red Theraband Monster walks, Hallway, 1 set  Red Theraband Hip circumduction?, Hallway, 1 set  Progress quad position therex    REMOVED:  Hip adduction x 20 reps with ball - promotes knee genu valgum  Hamstring stretch 3 x 30 seconds bilateral lower extremity - already has normal Hamstring flexibility (-20) in objective section of initial POC  Barney stretch, 2 x 30 seconds Bilateral lower extremity - getting more of a Hip Flexor stretch with prone quad stretch instead    Patient Education and Home Exercises     Home Exercises Provided and Patient Education Provided     Education provided:   - apply ice if delayed muscle soreness occurs  - continue Home exercise program   - Instructed patient to perf Dorsiflexion stretch with internal rotation  to help stretch anterior tibialis muscle      Written Home Exercises Provided: yes. Exercises were  reviewed and Jarrod was able to demonstrate them prior to the end of the session.  Jarrod demonstrated good  understanding of the education provided. See EMR under Patient Instructions for exercises provided during therapy sessions    ASSESSMENT     Jarrod demonstrates improved mobility and variable pain levels with radiculopathy since initial evaluation. Pt able to perform all therex with minimal pn provocation. Utilized manual tx to reduce pain and trigger points in Bilateral glutes and Iliotibial Band regions.    Jarrod Is slowly progressing well towards his goals.   Pt prognosis is Good.     Pt will continue to benefit from skilled outpatient physical therapy to address the deficits listed in the problem list box on initial evaluation, provide pt/family education and to maximize pt's level of independence in the home and community environment.     Pt's spiritual, cultural and educational needs considered and pt agreeable to plan of care and goals.     Anticipated barriers to physical therapy: genu valgum, BMI    Goals:   Short Term GOALS: 3 weeks  1. Patient is independent with HEP. PROGRESS 12/6/2022  2. Patient demonstrates independence with core activation and postural awareness while sitting and standing. PROGRESS 12/6/2022  3. Patient will reduce pn to 2/10 while performing daily activities. PROGRESS 12/6/2022  4. Patient will reduce LE radiculopathy frequency and intensity. PROGRESS 12/6/2022     Long Term GOALS: 6 weeks.   1. Patient demonstrates increased l/s extension ROM to 15 degrees.   2. Patient demonstrates increased core, hip and BLE strength by 1/2 grade or greater to improve tolerance to home chores.  3. Patient demonstrates independence with body mechanics while working.  4. Patient will improve FOTO limitation score to </= 41% to show improvement in condition and functional mobility.     PLAN     Plan of care Certification: 11/9/2022 to 1/30/2023.     Outpatient Physical Therapy 2 times weekly for 6  weeks to include the following interventions: Cervical/Lumbar Traction, Electrical Stimulation ,, Gait Training, Manual Therapy, Moist Heat/ Ice, Neuromuscular Re-ed, Orthotic Management and Training, Patient Education, Self Care, Therapeutic Activities, Therapeutic Exercise, and Ultrasound.     Continue with therapeutic exercises, stretching and modalities as needed to achieve goals per Plan of Care.    Jacqueline Simons, PT

## 2022-12-08 ENCOUNTER — CLINICAL SUPPORT (OUTPATIENT)
Dept: REHABILITATION | Facility: HOSPITAL | Age: 42
End: 2022-12-08
Attending: ORTHOPAEDIC SURGERY
Payer: MEDICAID

## 2022-12-08 DIAGNOSIS — R26.9 ABNORMAL GAIT: ICD-10-CM

## 2022-12-08 DIAGNOSIS — M54.17 RADICULAR PAIN OF LUMBOSACRAL REGION: Primary | ICD-10-CM

## 2022-12-08 PROCEDURE — 97110 THERAPEUTIC EXERCISES: CPT | Mod: PN

## 2022-12-08 NOTE — PROGRESS NOTES
[facility]   Physical Therapy Treatment Note     Name: Jarrod Lopez  [facility] Number: [id number]    Therapy Diagnosis:   Encounter Diagnoses   Name Primary?    Radicular pain of lumbosacral region Yes    Abnormal gait        Physician: Chas Mccarty MD    Visit Date: 12/8/2022    Physician Orders: PT Eval and Treat   Medical Diagnosis from Referral: M51.36 (ICD-10-CM) - DDD (degenerative disc disease), lumbar  Evaluation Date: 11/9/2022  Authorization Period Expiration: 12/31/2022  Plan of Care Expiration: 1/30/3023 or 12v post eval  Visit # (total) / Visits authorized: 5 / eval + 12 (POC 4 / 12)  PTA Visit #: 1/5     FOTO   visit 5 - 12/8/2022  3rd FOTO visit 12  Progress Note Due: 12/9/2022      Time In: 830  Time Out: 925  Total Billable Time: 55 minutes    SUBJECTIVE     Pt reports: pain this morning. Did his open books before he got out of bed.  .  He was compliant with home exercise program.  Response to previous treatment: no complaints  Functional change: none reported    Pain: 5/10  Location: bilateral lower legs and upper legs     OBJECTIVE     Objective Measures updated at progress report unless specified.     Treatment     NOTE: Grade 1 L3-4 RETROLISTHESIS: Vertebra has slipped backwards & SEVERE Bilateral knee genu valgum  Presents with: Dec l/s lordosis and excessive Post Pelvic Tilt  Want to: Promote Anterior Pelvic Tilt    therapeutic exercises to develop strength, endurance, ROM, flexibility, posture, and core stabilization for 55 minutes including:  ALL INTERVENTIONS BILLED AS THEREX PER MEDICAID GUIDELINES    Patient received intermittent mechanical lumbar traction x 15 min at 125# max/ 60# min, 30 seconds pull/ 10 seconds rest.    Work up to 50% of pt body weight in 5 pound increments     Bike x 10' level 3 to increase cardio output and promote weight loss prior to exercises - NOT PERFORMED time    TABLE:  Sidelying Open books x 10 reps bilateral   Abdominal bracing x 20  reps with 5 seconds  (NO Posterior pelvic tilt for retrolisthesis)  Supine Modified oblique crunches, brace abs and tap opp hand to opp knee, 20x  Red Theraband Sidelying Clamshells x 30 reps   Red Theraband Bridge x 20 reps (to counteract genu valgum)  Piriformis stretch with strap, 2 x 30 seconds Bilateral lower extremity   Prone Press ups x 10 reps with pressure over sacral area - no pressure today  Prone Hip extension, hands stacked, x 10 reps, 2 sets, alternate bilateral lower extremity  Prone quad stretch with strap, 2x30s, Bilateral    Quad Cat cow, 10x  Seated edge of mat, Core stab with wand, 3x, 30sec    STAND:  Gastroc stretch 2 x 30 seconds, Left lower extremity on step   Add next Cable Column Paloff Press, 7#, 20-30x Bilateral     Manual tx: NOT PERFORMED time  Soft Tissue Mobs along Left anterior tib muscle/extensor digitorum longus   Myofascial Release therabar to Bilateral glutes, Iliotibial Band, and left peroneals       Add NEXT:  [facility] with strap, 3x, 30sec pulling in multi direction  FUTURE:  Red Theraband Patience walks, Hallway, 1 set  Progress quad position therex    REMOVED:  Hip adduction x 20 reps with ball - promotes knee genu valgum  Hamstring stretch 3 x 30 seconds bilateral lower extremity - already has normal Hamstring flexibility (-20) in objective section of initial POC  Barney stretch, 2 x 30 seconds Bilateral lower extremity - getting more of a Hip Flexor stretch with prone quad stretch instead    Patient Education and Home Exercises     Home Exercises Provided and Patient Education Provided     Education provided:   - apply ice if delayed muscle soreness occurs  - continue Home exercise program   - Instructed patient to perf Dorsiflexion stretch with internal rotation  to help stretch anterior tibialis muscle      Written Home Exercises Provided: yes. Exercises were reviewed and Jarrod was able to demonstrate them prior to the end of the session.  Jarrod demonstrated  good  understanding of the education provided. See EMR under Patient Instructions for exercises provided during therapy sessions    ASSESSMENT     Jarrod demonstrates improved mobility and variable pain levels with radiculopathy since initial evaluation. Pt able to perform all therex with minimal pn provocation. Utilized mechanical lumbar traction to reduce compression of lumbar vertebrae. Reports relief on right side and Right Lower Extremity at end of session but not left side.     Jarrod Is slowly progressing well towards his goals.   Pt prognosis is Good.     Pt will continue to benefit from skilled outpatient physical therapy to address the deficits listed in the problem list box on initial evaluation, provide pt/family education and to maximize pt's level of independence in the home and community environment.     Pt's spiritual, cultural and educational needs considered and pt agreeable to plan of care and goals.     Anticipated barriers to physical therapy: genu valgum, BMI    Goals:   Short Term GOALS: 3 weeks  1. Patient is independent with HEP. PROGRESS 12/8/2022  2. Patient demonstrates independence with core activation and postural awareness while sitting and standing. PROGRESS 12/8/2022  3. Patient will reduce pn to 2/10 while performing daily activities. PROGRESS 12/8/2022  4. Patient will reduce LE radiculopathy frequency and intensity. PROGRESS 12/8/2022     Long Term GOALS: 6 weeks.   1. Patient demonstrates increased l/s extension ROM to 15 degrees.   2. Patient demonstrates increased core, hip and BLE strength by 1/2 grade or greater to improve tolerance to home chores.  3. Patient demonstrates independence with body mechanics while working.  4. Patient will improve FOTO limitation score to </= 41% to show improvement in condition and functional mobility.     PLAN     Plan of care Certification: 11/9/2022 to 1/30/2023.     Outpatient Physical Therapy 2 times weekly for 6 weeks to include the following  interventions: Cervical/Lumbar Traction, Electrical Stimulation ,, Gait Training, Manual Therapy, Moist Heat/ Ice, Neuromuscular Re-ed, Orthotic Management and Training, Patient Education, Self Care, Therapeutic Activities, Therapeutic Exercise, and Ultrasound.     Continue with therapeutic exercises, stretching and modalities as needed to achieve goals per Plan of Care.    Jacqueline Simons, PT

## 2022-12-12 ENCOUNTER — PATIENT MESSAGE (OUTPATIENT)
Dept: FAMILY MEDICINE | Facility: CLINIC | Age: 42
End: 2022-12-12

## 2022-12-12 DIAGNOSIS — M54.41 MIDLINE LOW BACK PAIN WITH RIGHT-SIDED SCIATICA, UNSPECIFIED CHRONICITY: Primary | ICD-10-CM

## 2022-12-12 RX ORDER — IBUPROFEN 800 MG/1
800 TABLET ORAL 3 TIMES DAILY PRN
Qty: 40 TABLET | Refills: 0 | Status: SHIPPED | OUTPATIENT
Start: 2022-12-12 | End: 2023-04-26 | Stop reason: SDUPTHER

## 2023-03-01 ENCOUNTER — OFFICE VISIT (OUTPATIENT)
Dept: FAMILY MEDICINE | Facility: CLINIC | Age: 43
End: 2023-03-01
Payer: COMMERCIAL

## 2023-03-01 VITALS
SYSTOLIC BLOOD PRESSURE: 168 MMHG | BODY MASS INDEX: 42.82 KG/M2 | TEMPERATURE: 98 F | HEIGHT: 71 IN | DIASTOLIC BLOOD PRESSURE: 110 MMHG | RESPIRATION RATE: 18 BRPM | HEART RATE: 78 BPM | WEIGHT: 305.88 LBS

## 2023-03-01 DIAGNOSIS — Z11.59 NEED FOR HEPATITIS C SCREENING TEST: ICD-10-CM

## 2023-03-01 DIAGNOSIS — I10 ESSENTIAL HYPERTENSION: Primary | ICD-10-CM

## 2023-03-01 DIAGNOSIS — Z13.1 DIABETES MELLITUS SCREENING: ICD-10-CM

## 2023-03-01 DIAGNOSIS — F32.A ANXIETY AND DEPRESSION: ICD-10-CM

## 2023-03-01 DIAGNOSIS — M54.41 MIDLINE LOW BACK PAIN WITH RIGHT-SIDED SCIATICA, UNSPECIFIED CHRONICITY: ICD-10-CM

## 2023-03-01 DIAGNOSIS — Z11.4 ENCOUNTER FOR SCREENING FOR HIV: ICD-10-CM

## 2023-03-01 DIAGNOSIS — F41.9 ANXIETY AND DEPRESSION: ICD-10-CM

## 2023-03-01 DIAGNOSIS — Z13.220 SCREENING CHOLESTEROL LEVEL: ICD-10-CM

## 2023-03-01 DIAGNOSIS — H10.32 ACUTE BACTERIAL CONJUNCTIVITIS OF LEFT EYE: ICD-10-CM

## 2023-03-01 PROCEDURE — 99213 OFFICE O/P EST LOW 20 MIN: CPT | Mod: S$GLB,,, | Performed by: NURSE PRACTITIONER

## 2023-03-01 PROCEDURE — 99213 PR OFFICE/OUTPT VISIT, EST, LEVL III, 20-29 MIN: ICD-10-PCS | Mod: S$GLB,,, | Performed by: NURSE PRACTITIONER

## 2023-03-01 PROCEDURE — 3080F PR MOST RECENT DIASTOLIC BLOOD PRESSURE >= 90 MM HG: ICD-10-PCS | Mod: CPTII,S$GLB,, | Performed by: NURSE PRACTITIONER

## 2023-03-01 PROCEDURE — 3008F BODY MASS INDEX DOCD: CPT | Mod: CPTII,S$GLB,, | Performed by: NURSE PRACTITIONER

## 2023-03-01 PROCEDURE — 1159F MED LIST DOCD IN RCRD: CPT | Mod: CPTII,S$GLB,, | Performed by: NURSE PRACTITIONER

## 2023-03-01 PROCEDURE — 4010F PR ACE/ARB THEARPY RXD/TAKEN: ICD-10-PCS | Mod: CPTII,S$GLB,, | Performed by: NURSE PRACTITIONER

## 2023-03-01 PROCEDURE — 3077F PR MOST RECENT SYSTOLIC BLOOD PRESSURE >= 140 MM HG: ICD-10-PCS | Mod: CPTII,S$GLB,, | Performed by: NURSE PRACTITIONER

## 2023-03-01 PROCEDURE — 4010F ACE/ARB THERAPY RXD/TAKEN: CPT | Mod: CPTII,S$GLB,, | Performed by: NURSE PRACTITIONER

## 2023-03-01 PROCEDURE — 3008F PR BODY MASS INDEX (BMI) DOCUMENTED: ICD-10-PCS | Mod: CPTII,S$GLB,, | Performed by: NURSE PRACTITIONER

## 2023-03-01 PROCEDURE — 3080F DIAST BP >= 90 MM HG: CPT | Mod: CPTII,S$GLB,, | Performed by: NURSE PRACTITIONER

## 2023-03-01 PROCEDURE — 1159F PR MEDICATION LIST DOCUMENTED IN MEDICAL RECORD: ICD-10-PCS | Mod: CPTII,S$GLB,, | Performed by: NURSE PRACTITIONER

## 2023-03-01 PROCEDURE — 3077F SYST BP >= 140 MM HG: CPT | Mod: CPTII,S$GLB,, | Performed by: NURSE PRACTITIONER

## 2023-03-01 RX ORDER — AMLODIPINE BESYLATE 10 MG/1
10 TABLET ORAL DAILY
Qty: 30 TABLET | Refills: 3 | Status: SHIPPED | OUTPATIENT
Start: 2023-03-01 | End: 2023-05-03 | Stop reason: ALTCHOICE

## 2023-03-01 RX ORDER — CIPROFLOXACIN HYDROCHLORIDE 3 MG/ML
1 SOLUTION/ DROPS OPHTHALMIC 4 TIMES DAILY
Qty: 1.4 ML | Refills: 0 | Status: SHIPPED | OUTPATIENT
Start: 2023-03-01 | End: 2023-03-08

## 2023-03-01 RX ORDER — FLUOXETINE HYDROCHLORIDE 20 MG/1
20 CAPSULE ORAL DAILY
Qty: 30 CAPSULE | Refills: 11 | Status: SHIPPED | OUTPATIENT
Start: 2023-03-01 | End: 2023-05-03 | Stop reason: ALTCHOICE

## 2023-03-01 RX ORDER — LOSARTAN POTASSIUM 50 MG/1
50 TABLET ORAL DAILY
Qty: 90 TABLET | Refills: 3 | Status: SHIPPED | OUTPATIENT
Start: 2023-03-01 | End: 2023-04-26 | Stop reason: SDUPTHER

## 2023-03-01 RX ORDER — HYDROCHLOROTHIAZIDE 12.5 MG/1
12.5 TABLET ORAL DAILY
Qty: 30 TABLET | Refills: 2 | Status: SHIPPED | OUTPATIENT
Start: 2023-03-01 | End: 2023-04-26 | Stop reason: SDUPTHER

## 2023-03-01 NOTE — PROGRESS NOTES
Patient ID: Jarrod Lopez is a 42 y.o. male.    Chief Complaint: Follow-up (43 yo male here for HTN follow up. Pt states he woke up times 3 days ago with left eye redness/irritation with watery discharge but no color or pain. KM) and Conjunctivitis    Mr. Lopez presents for follow up and for evaluation of unilateal eye irritation. He states it began approximately 3 days ago. He denies any pain to the affected eye. He denies contact with anyone sick that he knows of. 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 He states his blood pressure has also been elevated but he has not been taking medication as prescribed. He denies any other issues or complaints.     He denies chest pain, heart palpitations or any other issue at this time of this visit.     Conjunctivitis  This is a new problem. The current episode started in the past 7 days. The problem occurs constantly. The problem has been gradually worsening. Nothing aggravates the symptoms. He has tried nothing for the symptoms. The treatment provided no relief.         Past Medical History:   Diagnosis Date    Hypertension      No past surgical history on file.      Tobacco History:  reports that he has quit smoking. His smoking use included cigarettes. He has never used smokeless tobacco.      Review of patient's allergies indicates:  No Known Allergies    Current Outpatient Medications:     ibuprofen (ADVIL,MOTRIN) 800 MG tablet, Take 1 tablet (800 mg total) by mouth 3 (three) times daily as needed for Pain., Disp: 40 tablet, Rfl: 0    amLODIPine (NORVASC) 10 MG tablet, Take 1 tablet (10 mg total) by mouth once daily., Disp: 30 tablet, Rfl: 3    ciprofloxacin HCl (CILOXAN) 0.3 % ophthalmic solution, Place 1 drop into the left eye 4 (four) times daily. for 7 days, Disp: 1.4 mL, Rfl: 0    FLUoxetine 20 MG capsule, Take 1 capsule (20 mg total) by mouth once daily., Disp: 30 capsule, Rfl: 11    hydroCHLOROthiazide (HYDRODIURIL) 12.5  "MG Tab, Take 1 tablet (12.5 mg total) by mouth once daily., Disp: 30 tablet, Rfl: 2    losartan (COZAAR) 50 MG tablet, Take 1 tablet (50 mg total) by mouth once daily., Disp: 90 tablet, Rfl: 3    traMADoL (ULTRAM) 50 mg tablet, Take 1 tablet (50 mg total) by mouth every 12 (twelve) hours as needed for Pain., Disp: 30 tablet, Rfl: 0    Review of Systems       Objective:      Vitals:    03/01/23 1403   BP: (!) 168/110   Pulse: 78   Resp: 18   Temp: 97.9 °F (36.6 °C)   TempSrc: Oral   Weight: (!) 138.8 kg (305 lb 14.4 oz)   Height: 5' 11" (1.803 m)     Physical Exam  Vitals reviewed.   Constitutional:       General: He is not in acute distress.     Appearance: Normal appearance. He is obese. He is not ill-appearing, toxic-appearing or diaphoretic.   HENT:      Head: Normocephalic and atraumatic.      Right Ear: Tympanic membrane and external ear normal. There is no impacted cerumen.      Left Ear: Tympanic membrane and external ear normal. There is no impacted cerumen.      Nose: Nose normal. No congestion or rhinorrhea.      Mouth/Throat:      Mouth: Mucous membranes are moist.      Pharynx: Oropharynx is clear. No oropharyngeal exudate or posterior oropharyngeal erythema.   Eyes:      General: No scleral icterus.        Right eye: No discharge.         Left eye: No discharge.      Extraocular Movements: Extraocular movements intact.      Conjunctiva/sclera:      Left eye: Left conjunctiva is injected. Exudate present.      Pupils: Pupils are equal, round, and reactive to light.   Cardiovascular:      Rate and Rhythm: Normal rate and regular rhythm.      Pulses: Normal pulses.      Heart sounds: Normal heart sounds. No murmur heard.    No friction rub. No gallop.   Pulmonary:      Effort: Pulmonary effort is normal. No respiratory distress.      Breath sounds: Normal breath sounds. No wheezing, rhonchi or rales.   Chest:      Chest wall: No tenderness.   Abdominal:      General: Abdomen is flat. Bowel sounds are " normal.      Palpations: Abdomen is soft. There is no mass.      Tenderness: There is no abdominal tenderness. There is no guarding or rebound.   Musculoskeletal:         General: No swelling or signs of injury. Normal range of motion.      Cervical back: Normal range of motion and neck supple. No rigidity or tenderness.      Right lower leg: No edema.      Left lower leg: No edema.   Skin:     General: Skin is warm and dry.      Capillary Refill: Capillary refill takes less than 2 seconds.      Coloration: Skin is not pale.      Findings: No bruising or erythema.   Neurological:      General: No focal deficit present.      Mental Status: He is alert and oriented to person, place, and time. Mental status is at baseline.      Motor: No weakness.      Coordination: Coordination normal.      Gait: Gait normal.   Psychiatric:         Mood and Affect: Mood normal.         Behavior: Behavior normal.         Thought Content: Thought content normal.         Judgment: Judgment normal.         Assessment:       1. Essential hypertension    2. Screening cholesterol level    3. Diabetes mellitus screening    4. Midline low back pain with right-sided sciatica, unspecified chronicity    5. Acute bacterial conjunctivitis of left eye    6. Encounter for screening for HIV    7. Need for hepatitis C screening test    8. Anxiety and depression           Plan:       Essential hypertension  -     CBC auto differential; Future; Expected date: 03/01/2023  -     Comprehensive Metabolic Panel; Future; Expected date: 03/01/2023  -     hydroCHLOROthiazide (HYDRODIURIL) 12.5 MG Tab; Take 1 tablet (12.5 mg total) by mouth once daily.  Dispense: 30 tablet; Refill: 2  -     amLODIPine (NORVASC) 10 MG tablet; Take 1 tablet (10 mg total) by mouth once daily.  Dispense: 30 tablet; Refill: 3  -     losartan (COZAAR) 50 MG tablet; Take 1 tablet (50 mg total) by mouth once daily.  Dispense: 90 tablet; Refill: 3    Screening cholesterol level  -      Lipid Panel; Future; Expected date: 03/01/2023    Diabetes mellitus screening  -     Hemoglobin A1C; Future; Expected date: 03/01/2023    Midline low back pain with right-sided sciatica, unspecified chronicity  -     Ambulatory referral/consult to Orthopedics; Future; Expected date: 03/08/2023    Acute bacterial conjunctivitis of left eye  -     ciprofloxacin HCl (CILOXAN) 0.3 % ophthalmic solution; Place 1 drop into the left eye 4 (four) times daily. for 7 days  Dispense: 1.4 mL; Refill: 0    Encounter for screening for HIV  -     HIV 1/2 Ag/Ab (4th Gen); Future; Expected date: 03/01/2023    Need for hepatitis C screening test  -     Hepatitis C Antibody; Future; Expected date: 03/01/2023    Anxiety and depression  -     FLUoxetine 20 MG capsule; Take 1 capsule (20 mg total) by mouth once daily.  Dispense: 30 capsule; Refill: 11      Follow up in about 8 weeks (around 4/26/2023), or if symptoms worsen or fail to improve, for Anxiety?HTN.        3/3/2023 Mirella Cleaning NP

## 2023-03-01 NOTE — LETTER
March 1, 2023      [facility]  [address]  [facility] [address]  Phone: [phone]  Fax: [phone]       Patient: Jarrod Lopez   YOB: 1980  Date of Visit: 03/01/2023    To Whom It May Concern:    Chandler Lopez  was at [facility] on 03/01/2023. The patient may return to work/school on 3/1/2023 with no restrictions. If you have any questions or concerns, or if I can be of further assistance, please do not hesitate to contact me.    Sincerely,        EVON Koehler

## 2023-03-09 ENCOUNTER — PATIENT MESSAGE (OUTPATIENT)
Dept: FAMILY MEDICINE | Facility: CLINIC | Age: 43
End: 2023-03-09

## 2023-03-20 ENCOUNTER — OFFICE VISIT (OUTPATIENT)
Dept: ORTHOPEDICS | Facility: CLINIC | Age: 43
End: 2023-03-20
Payer: COMMERCIAL

## 2023-03-20 VITALS — WEIGHT: 305 LBS | HEIGHT: 71 IN | BODY MASS INDEX: 42.7 KG/M2

## 2023-03-20 DIAGNOSIS — M54.41 MIDLINE LOW BACK PAIN WITH RIGHT-SIDED SCIATICA, UNSPECIFIED CHRONICITY: ICD-10-CM

## 2023-03-20 DIAGNOSIS — Q74.1 GENU VALGUS, CONGENITAL: ICD-10-CM

## 2023-03-20 DIAGNOSIS — M51.36 DDD (DEGENERATIVE DISC DISEASE), LUMBAR: Primary | ICD-10-CM

## 2023-03-20 DIAGNOSIS — M17.11 PRIMARY OSTEOARTHRITIS OF RIGHT KNEE: ICD-10-CM

## 2023-03-20 DIAGNOSIS — M17.12 PRIMARY OSTEOARTHRITIS OF LEFT KNEE: ICD-10-CM

## 2023-03-20 DIAGNOSIS — M43.16 SPONDYLOLISTHESIS OF LUMBAR REGION: ICD-10-CM

## 2023-03-20 PROCEDURE — 1160F RVW MEDS BY RX/DR IN RCRD: CPT | Mod: CPTII,S$GLB,, | Performed by: ORTHOPAEDIC SURGERY

## 2023-03-20 PROCEDURE — 3008F PR BODY MASS INDEX (BMI) DOCUMENTED: ICD-10-PCS | Mod: CPTII,S$GLB,, | Performed by: ORTHOPAEDIC SURGERY

## 2023-03-20 PROCEDURE — 4010F PR ACE/ARB THEARPY RXD/TAKEN: ICD-10-PCS | Mod: CPTII,S$GLB,, | Performed by: ORTHOPAEDIC SURGERY

## 2023-03-20 PROCEDURE — 3008F BODY MASS INDEX DOCD: CPT | Mod: CPTII,S$GLB,, | Performed by: ORTHOPAEDIC SURGERY

## 2023-03-20 PROCEDURE — 1159F MED LIST DOCD IN RCRD: CPT | Mod: CPTII,S$GLB,, | Performed by: ORTHOPAEDIC SURGERY

## 2023-03-20 PROCEDURE — 1160F PR REVIEW ALL MEDS BY PRESCRIBER/CLIN PHARMACIST DOCUMENTED: ICD-10-PCS | Mod: CPTII,S$GLB,, | Performed by: ORTHOPAEDIC SURGERY

## 2023-03-20 PROCEDURE — 20610 DRAIN/INJ JOINT/BURSA W/O US: CPT | Mod: 50,S$GLB,, | Performed by: ORTHOPAEDIC SURGERY

## 2023-03-20 PROCEDURE — 99214 PR OFFICE/OUTPT VISIT, EST, LEVL IV, 30-39 MIN: ICD-10-PCS | Mod: 25,S$GLB,, | Performed by: ORTHOPAEDIC SURGERY

## 2023-03-20 PROCEDURE — 20610 LARGE JOINT ASPIRATION/INJECTION: R KNEE: ICD-10-PCS | Mod: 50,S$GLB,, | Performed by: ORTHOPAEDIC SURGERY

## 2023-03-20 PROCEDURE — 99214 OFFICE O/P EST MOD 30 MIN: CPT | Mod: 25,S$GLB,, | Performed by: ORTHOPAEDIC SURGERY

## 2023-03-20 PROCEDURE — 4010F ACE/ARB THERAPY RXD/TAKEN: CPT | Mod: CPTII,S$GLB,, | Performed by: ORTHOPAEDIC SURGERY

## 2023-03-20 PROCEDURE — 1159F PR MEDICATION LIST DOCUMENTED IN MEDICAL RECORD: ICD-10-PCS | Mod: CPTII,S$GLB,, | Performed by: ORTHOPAEDIC SURGERY

## 2023-03-20 RX ORDER — TRIAMCINOLONE ACETONIDE 40 MG/ML
40 INJECTION, SUSPENSION INTRA-ARTICULAR; INTRAMUSCULAR
Status: DISCONTINUED | OUTPATIENT
Start: 2023-03-20 | End: 2023-03-20 | Stop reason: HOSPADM

## 2023-03-20 RX ORDER — IBUPROFEN 800 MG/1
800 TABLET ORAL
Qty: 90 TABLET | Refills: 5 | Status: SHIPPED | OUTPATIENT
Start: 2023-03-20 | End: 2023-04-26 | Stop reason: ALTCHOICE

## 2023-03-20 RX ORDER — MULTIVITAMIN
1 TABLET ORAL DAILY
COMMUNITY
End: 2023-05-03 | Stop reason: ALTCHOICE

## 2023-03-20 RX ADMIN — TRIAMCINOLONE ACETONIDE 40 MG: 40 INJECTION, SUSPENSION INTRA-ARTICULAR; INTRAMUSCULAR at 08:03

## 2023-03-20 NOTE — PROGRESS NOTES
Subjective:       Patient ID: Jarrod Lopez is a 42 y.o. male.    Chief Complaint: Pain of the Spine (Patient is here with complaints of low Lumbar pain, Hip and knee pain is greater), Pain of the Right Knee (Chronic Bilateral knee pain,  Pop, grind and give away off/on, Left is worse than the right, swell off/on), Pain of the Left Knee, Pain of the Right Hip (Chronic bilateral hip pain, denies any groin pain, states the pain is on the later portion of his hips, side sleeper, has an abnormal gate. ), and Pain of the Left Hip      History of Present Illness    Prior to meeting with the patient I reviewed the medical chart in [facility]. This included reviewing the previous progress notes from our office, review of the patient's last appointment with their primary care provider, review of any visits to the emergency room, and review of any pain management appointments or procedures.   Patient is here to be re-evaluated for multiple orthopedic issues.  Continues to have significant back pain that radiates around to his buttocks area with some lower extremity radicular symptoms.  Has been through physical therapy and chiropractic treatments in the past but neither have helped.  Also here complaining of significant bilateral knee pain with a history of genu valgus his entire life.  His knee pain is significantly worsened and the knock-kneed appearance has significantly worsened over the last 6 years.    Current Medications  Current Outpatient Medications   Medication Sig Dispense Refill    amLODIPine (NORVASC) 10 MG tablet Take 1 tablet (10 mg total) by mouth once daily. 30 tablet 3    FLUoxetine 20 MG capsule Take 1 capsule (20 mg total) by mouth once daily. 30 capsule 11    hydroCHLOROthiazide (HYDRODIURIL) 12.5 MG Tab Take 1 tablet (12.5 mg total) by mouth once daily. 30 tablet 2    ibuprofen (ADVIL,MOTRIN) 800 MG tablet Take 1 tablet (800 mg total) by mouth 3 (three) times daily as needed for Pain. 40 tablet 0    losartan  (COZAAR) 50 MG tablet Take 1 tablet (50 mg total) by mouth once daily. 90 tablet 3    multivitamin (THERAGRAN) per tablet Take 1 tablet by mouth once daily.      traMADoL (ULTRAM) 50 mg tablet Take 1 tablet (50 mg total) by mouth every 12 (twelve) hours as needed for Pain. (Patient not taking: Reported on 3/20/2023) 30 tablet 0     No current facility-administered medications for this visit.       Allergies  Review of patient's allergies indicates:  No Known Allergies    Past Medical History  Past Medical History:   Diagnosis Date    Hypertension        Surgical History  History reviewed. No pertinent surgical history.    Family History:   Family History   Problem Relation Age of Onset    Hypertension Mother        Social History:   Social History     Socioeconomic History    Marital status: Single    Number of children: 5   Tobacco Use    Smoking status: Former     Types: Cigarettes    Smokeless tobacco: Never   Substance and Sexual Activity    Alcohol use: No    Drug use: Yes     Types: Marijuana    Sexual activity: Not Currently     Partners: Female       Hospitalization/Major Diagnostic Procedure:     Review of Systems     General/Constitutional:  Chills denies. Fatigue denies. Fever denies. Weight gain denies. Weight loss denies.    Respiratory:  Shortness of breath denies.    Cardiovascular:  Chest pain denies.    Gastrointestinal:  Constipation denies. Diarrhea denies. Nausea denies. Vomiting denies.     Hematology:  Easy bruising denies. Prolonged bleeding denies.     Genitourinary:  Frequent urination denies. Pain in lower back denies. Painful urination denies.     Musculoskeletal:  See HPI for details    Skin:  Rash denies.    Neurologic:  Dizziness denies. Gait abnormalities denies. Seizures denies. Tingling/Numbess denies.    Psychiatric:  Anxiety denies. Depressed mood denies.     Objective:   Vital Signs: There were no vitals filed for this visit.     Physical Exam      General Examination:      Constitutional: The patient is alert and oriented to lace person and time. Mood is pleasant.     Head/Face: Normal facial features normal eyebrows    Eyes: Normal extraocular motion bilaterally    Lungs: Respirations are equal and unlabored    Gait is coordinated.    Cardiovascular: There are no swelling or varicosities present.    Lymphatic: Negative for adenopathy    Skin: Normal    Neurological: Level of consciousness normal. Oriented to place person and time and situation    Psychiatric: Oriented to time place person and situation    Patient is obese.  He has an antalgic gait.  He has pain fully obvious bilateral genu valgus.  Significant medial and lateral joint line pain is noted.  Positive patellar grind on both knees.  Range of motion and strength within normal limits with no neurological weakness identifiable.  Negative straight leg raise maneuver.    XRAY Report/ Interpretation:  Lumbar AP and lateral x-rays taken in the office today and reviewed the patient demonstrate multilevel degenerative changes with significant degenerative disc disease at L4-5 and L2-3.  There is a retrolisthesis at L3-4 and degenerative disc disease at L5-S1 with facet sclerosis from L2 to the sacrum.  There are osteophytes anterior, posterior, and lateral.    AP pelvis x-ray taken in the office today reviewed the patient demonstrates no significant hip arthritic changes.    Bilateral knee x-rays AP and lateral view demonstrate moderate to significant medial joint line degenerative joint disease with multiple osteophytes including a superior patella osteophyte lateral tibial plateau and medial tibial plateau osteophytes.  There is also some lateral joint line degenerative changes noted.      Assessment:       1. Pain    2. DDD (degenerative disc disease), lumbar    3. Midline low back pain with right-sided sciatica, unspecified chronicity    4. Spondylolisthesis of lumbar region    5. Genu valgus, congenital    6. Primary  osteoarthritis of both knees          Plan:       Jarrod was seen today for pain, pain, pain, pain and pain.    Diagnoses and all orders for this visit:    Pain  -     X-Ray Lumbar Spine Ap And Lateral  -     Cancel: X-Ray Knee 3 View Bilateral  -     X-Ray Knee 1 or 2 View Bilateral    DDD (degenerative disc disease), lumbar  -     X-Ray Lumbar Spine Ap And Lateral  -     X-Ray Pelvis Routine AP    Midline low back pain with right-sided sciatica, unspecified chronicity  -     Ambulatory referral/consult to Orthopedics  -     X-Ray Pelvis Routine AP    Spondylolisthesis of lumbar region    Genu valgus, congenital    Primary osteoarthritis of both knees         No follow-ups on file.  This is to attest that the physician's assistant David Aguirre served in the capacity as a scribe for this patient's encounter.  This is also to verify that I have reviewed the patient's history and helped formulate the treatment plan and discussed it with the physician's assistant.  I have actively participated  in the evaluation and treatment plan for this patient visit.  The treatment plan and medical decision-making is as outlined below:  First and foremost we discussed weight loss as an option to help decrease his back and his knee pain.  I suggest that the patient discuss this with his primary care provider in detail.  Today he will be given bilateral knee intra-articular injections with 40 mg of Kenalog and 3 cc lidocaine.  Regarding his lumbar spine we need an updated lumbar MRI and he will follow up in 3 weeks to review.    This patient is 42 years old but has a lumbar spine and bilateral knees of some body in their 60s.  At some point he is going to need knee replacements of both knees within the next 10 years probably.  After reviewing the lumbar MRI more than likely we will refer him to Pain Management for some type of interventional treatment option before entertaining the thought of surgery.  Patient given a prescription  for ibuprofen 800 mg t.i.d. to help with pain and inflammation.    Treatment options were discussed with regards to the nature of the medical condition. Conservative pain intervention and surgical options were discussed in detail. The probability of success of each separate treatment option was discussed. The patient expressed a clear understanding of the treatment options. With regards to surgery, the procedure risk, benefits, complications, and outcomes were discussed. No guarantees were given with regards to surgical outcome.   The risk of complications, morbidity, and mortality of patient management decisions have been made at the time of this visit. These are associated with the patient's problems, diagnostic procedures and treatment options. This includes the possible management options selected and those considered but not selected by the patient after shared medical decision making we discussed with the patient.     This note was created using Dragon voice recognition software that occasionally misinterpreted phrases or words.

## 2023-03-20 NOTE — LETTER
March 20, 2023      [facility]  [address]  [place] [address]  Phone: [phone]  Fax: [phone]       Patient: Jarrod Lopez   YOB: 1980  Date of Visit: 03/20/2023    To Whom It May Concern:    Chandler Lopez  was at our office on 03/20/2023. The patient may return to work on 3/20/2023 with no restrictions. If you have any questions or concerns, or if I can be of further assistance, please do not hesitate to contact me.    Sincerely,      Chas Mccarty MD

## 2023-03-20 NOTE — PROCEDURES
Large Joint Aspiration/Injection: R knee    Date/Time: 3/20/2023 8:15 AM  Performed by: Chas Mccarty MD  Authorized by: Chas Mccatry MD     Consent Done?:  Yes (Verbal)  Indications:  Pain and arthritis  Site marked: the procedure site was marked    Timeout: prior to procedure the correct patient, procedure, and site was verified    Prep: patient was prepped and draped in usual sterile fashion      Local anesthesia used?: Yes    Local anesthetic:  Lidocaine 1% without epinephrine    Details:  Needle Size:  25 G  Ultrasonic Guidance for needle placement?: No    Location:  Knee  Site:  R knee  Medications:  40 mg triamcinolone acetonide 40 mg/mL  Patient tolerance:  Patient tolerated the procedure well with no immediate complications  Large Joint Aspiration/Injection: L knee    Date/Time: 3/20/2023 8:15 AM  Performed by: Chas Mccarty MD  Authorized by: Chas Mccarty MD     Consent Done?:  Yes (Verbal)  Indications:  Arthritis and pain  Site marked: the procedure site was marked    Timeout: prior to procedure the correct patient, procedure, and site was verified    Prep: patient was prepped and draped in usual sterile fashion      Local anesthesia used?: Yes    Local anesthetic:  Lidocaine 1% without epinephrine    Details:  Needle Size:  25 G  Ultrasonic Guidance for needle placement?: No    Location:  Knee  Site:  L knee  Medications:  40 mg triamcinolone acetonide 40 mg/mL  Patient tolerance:  Patient tolerated the procedure well with no immediate complications

## 2023-04-04 ENCOUNTER — LAB VISIT (OUTPATIENT)
Dept: LAB | Facility: HOSPITAL | Age: 43
End: 2023-04-04
Attending: NURSE PRACTITIONER
Payer: COMMERCIAL

## 2023-04-04 ENCOUNTER — HOSPITAL ENCOUNTER (OUTPATIENT)
Dept: RADIOLOGY | Facility: HOSPITAL | Age: 43
Discharge: HOME | End: 2023-04-04
Attending: ORTHOPAEDIC SURGERY
Payer: MEDICAID

## 2023-04-04 DIAGNOSIS — Z13.220 SCREENING CHOLESTEROL LEVEL: ICD-10-CM

## 2023-04-04 DIAGNOSIS — I10 ESSENTIAL HYPERTENSION: ICD-10-CM

## 2023-04-04 DIAGNOSIS — Z11.4 ENCOUNTER FOR SCREENING FOR HIV: ICD-10-CM

## 2023-04-04 DIAGNOSIS — M51.36 DDD (DEGENERATIVE DISC DISEASE), LUMBAR: ICD-10-CM

## 2023-04-04 DIAGNOSIS — M54.41 MIDLINE LOW BACK PAIN WITH RIGHT-SIDED SCIATICA, UNSPECIFIED CHRONICITY: ICD-10-CM

## 2023-04-04 DIAGNOSIS — Z11.59 NEED FOR HEPATITIS C SCREENING TEST: ICD-10-CM

## 2023-04-04 DIAGNOSIS — M43.16 SPONDYLOLISTHESIS OF LUMBAR REGION: ICD-10-CM

## 2023-04-04 DIAGNOSIS — Z13.1 DIABETES MELLITUS SCREENING: ICD-10-CM

## 2023-04-04 LAB
ALBUMIN SERPL BCP-MCNC: 4.3 G/DL (ref 3.5–5.2)
ALP SERPL-CCNC: 58 U/L (ref 55–135)
ALT SERPL W/O P-5'-P-CCNC: 13 U/L (ref 10–44)
ANION GAP SERPL CALC-SCNC: 8 MMOL/L (ref 8–16)
AST SERPL-CCNC: 17 U/L (ref 10–40)
BASOPHILS # BLD AUTO: 0.07 K/UL (ref 0–0.2)
BASOPHILS NFR BLD: 0.8 % (ref 0–1.9)
BILIRUB SERPL-MCNC: 0.4 MG/DL (ref 0.1–1)
BUN SERPL-MCNC: 18 MG/DL (ref 6–20)
CALCIUM SERPL-MCNC: 9.2 MG/DL (ref 8.7–10.5)
CHLORIDE SERPL-SCNC: 108 MMOL/L (ref 95–110)
CHOLEST SERPL-MCNC: 193 MG/DL (ref 120–199)
CHOLEST/HDLC SERPL: 3.3 {RATIO} (ref 2–5)
CO2 SERPL-SCNC: 25 MMOL/L (ref 23–29)
CREAT SERPL-MCNC: 0.9 MG/DL (ref 0.5–1.4)
DIFFERENTIAL METHOD: ABNORMAL
EOSINOPHIL # BLD AUTO: 0.2 K/UL (ref 0–0.5)
EOSINOPHIL NFR BLD: 2 % (ref 0–8)
ERYTHROCYTE [DISTWIDTH] IN BLOOD BY AUTOMATED COUNT: 15.4 % (ref 11.5–14.5)
EST. GFR  (NO RACE VARIABLE): >60 ML/MIN/1.73 M^2
ESTIMATED AVG GLUCOSE: 120 MG/DL (ref 68–131)
GLUCOSE SERPL-MCNC: 78 MG/DL (ref 70–110)
HBA1C MFR BLD: 5.8 % (ref 4.5–6.2)
HCT VFR BLD AUTO: 43.4 % (ref 40–54)
HDLC SERPL-MCNC: 59 MG/DL (ref 40–75)
HDLC SERPL: 30.6 % (ref 20–50)
HGB BLD-MCNC: 13.7 G/DL (ref 14–18)
IMM GRANULOCYTES # BLD AUTO: 0.02 K/UL (ref 0–0.04)
IMM GRANULOCYTES NFR BLD AUTO: 0.2 % (ref 0–0.5)
LDLC SERPL CALC-MCNC: 128.6 MG/DL (ref 63–159)
LYMPHOCYTES # BLD AUTO: 1.9 K/UL (ref 1–4.8)
LYMPHOCYTES NFR BLD: 23.3 % (ref 18–48)
MCH RBC QN AUTO: 27.1 PG (ref 27–31)
MCHC RBC AUTO-ENTMCNC: 31.6 G/DL (ref 32–36)
MCV RBC AUTO: 86 FL (ref 82–98)
MONOCYTES # BLD AUTO: 0.5 K/UL (ref 0.3–1)
MONOCYTES NFR BLD: 6.2 % (ref 4–15)
NEUTROPHILS # BLD AUTO: 5.6 K/UL (ref 1.8–7.7)
NEUTROPHILS NFR BLD: 67.5 % (ref 38–73)
NONHDLC SERPL-MCNC: 134 MG/DL
NRBC BLD-RTO: 0 /100 WBC
PLATELET # BLD AUTO: 253 K/UL (ref 150–450)
PMV BLD AUTO: 11.1 FL (ref 9.2–12.9)
POTASSIUM SERPL-SCNC: 4 MMOL/L (ref 3.5–5.1)
PROT SERPL-MCNC: 7.4 G/DL (ref 6–8.4)
RBC # BLD AUTO: 5.05 M/UL (ref 4.6–6.2)
SODIUM SERPL-SCNC: 141 MMOL/L (ref 136–145)
TRIGL SERPL-MCNC: 27 MG/DL (ref 30–150)
WBC # BLD AUTO: 8.34 K/UL (ref 3.9–12.7)

## 2023-04-04 PROCEDURE — 86803 HEPATITIS C AB TEST: CPT | Performed by: NURSE PRACTITIONER

## 2023-04-04 PROCEDURE — 72148 MRI LUMBAR SPINE W/O DYE: CPT | Mod: TC,PO

## 2023-04-04 PROCEDURE — 85025 COMPLETE CBC W/AUTO DIFF WBC: CPT | Performed by: NURSE PRACTITIONER

## 2023-04-04 PROCEDURE — 80061 LIPID PANEL: CPT | Performed by: NURSE PRACTITIONER

## 2023-04-04 PROCEDURE — 36415 COLL VENOUS BLD VENIPUNCTURE: CPT | Performed by: NURSE PRACTITIONER

## 2023-04-04 PROCEDURE — 83036 HEMOGLOBIN GLYCOSYLATED A1C: CPT | Performed by: NURSE PRACTITIONER

## 2023-04-04 PROCEDURE — 87389 HIV-1 AG W/HIV-1&-2 AB AG IA: CPT | Performed by: NURSE PRACTITIONER

## 2023-04-04 PROCEDURE — 80053 COMPREHEN METABOLIC PANEL: CPT | Performed by: NURSE PRACTITIONER

## 2023-04-05 ENCOUNTER — OFFICE VISIT (OUTPATIENT)
Dept: ORTHOPEDICS | Facility: CLINIC | Age: 43
End: 2023-04-05
Payer: COMMERCIAL

## 2023-04-05 VITALS
BODY MASS INDEX: 42.7 KG/M2 | DIASTOLIC BLOOD PRESSURE: 82 MMHG | HEIGHT: 71 IN | SYSTOLIC BLOOD PRESSURE: 132 MMHG | WEIGHT: 305 LBS

## 2023-04-05 DIAGNOSIS — M23.200 OLD TEAR OF LATERAL MENISCUS OF RIGHT KNEE, UNSPECIFIED TEAR TYPE: ICD-10-CM

## 2023-04-05 DIAGNOSIS — M17.11 PRIMARY OSTEOARTHRITIS OF RIGHT KNEE: ICD-10-CM

## 2023-04-05 DIAGNOSIS — M23.201 OLD TEAR OF LATERAL MENISCUS OF LEFT KNEE, UNSPECIFIED TEAR TYPE: ICD-10-CM

## 2023-04-05 DIAGNOSIS — M17.12 PRIMARY OSTEOARTHRITIS OF LEFT KNEE: Primary | ICD-10-CM

## 2023-04-05 LAB
HCV AB S/CO SERPL IA: NON REACTIVE
HIV 1+2 AB+HIV1 P24 AG SERPL QL IA: NON REACTIVE

## 2023-04-05 PROCEDURE — 4010F ACE/ARB THERAPY RXD/TAKEN: CPT | Mod: CPTII,S$GLB,, | Performed by: ORTHOPAEDIC SURGERY

## 2023-04-05 PROCEDURE — 3044F HG A1C LEVEL LT 7.0%: CPT | Mod: CPTII,S$GLB,, | Performed by: ORTHOPAEDIC SURGERY

## 2023-04-05 PROCEDURE — 3044F PR MOST RECENT HEMOGLOBIN A1C LEVEL <7.0%: ICD-10-PCS | Mod: CPTII,S$GLB,, | Performed by: ORTHOPAEDIC SURGERY

## 2023-04-05 PROCEDURE — 3079F PR MOST RECENT DIASTOLIC BLOOD PRESSURE 80-89 MM HG: ICD-10-PCS | Mod: CPTII,S$GLB,, | Performed by: ORTHOPAEDIC SURGERY

## 2023-04-05 PROCEDURE — 99213 PR OFFICE/OUTPT VISIT, EST, LEVL III, 20-29 MIN: ICD-10-PCS | Mod: S$GLB,,, | Performed by: ORTHOPAEDIC SURGERY

## 2023-04-05 PROCEDURE — 3079F DIAST BP 80-89 MM HG: CPT | Mod: CPTII,S$GLB,, | Performed by: ORTHOPAEDIC SURGERY

## 2023-04-05 PROCEDURE — 3075F SYST BP GE 130 - 139MM HG: CPT | Mod: CPTII,S$GLB,, | Performed by: ORTHOPAEDIC SURGERY

## 2023-04-05 PROCEDURE — 3075F PR MOST RECENT SYSTOLIC BLOOD PRESS GE 130-139MM HG: ICD-10-PCS | Mod: CPTII,S$GLB,, | Performed by: ORTHOPAEDIC SURGERY

## 2023-04-05 PROCEDURE — 99213 OFFICE O/P EST LOW 20 MIN: CPT | Mod: S$GLB,,, | Performed by: ORTHOPAEDIC SURGERY

## 2023-04-05 PROCEDURE — 1160F RVW MEDS BY RX/DR IN RCRD: CPT | Mod: CPTII,S$GLB,, | Performed by: ORTHOPAEDIC SURGERY

## 2023-04-05 PROCEDURE — 3008F PR BODY MASS INDEX (BMI) DOCUMENTED: ICD-10-PCS | Mod: CPTII,S$GLB,, | Performed by: ORTHOPAEDIC SURGERY

## 2023-04-05 PROCEDURE — 4010F PR ACE/ARB THEARPY RXD/TAKEN: ICD-10-PCS | Mod: CPTII,S$GLB,, | Performed by: ORTHOPAEDIC SURGERY

## 2023-04-05 PROCEDURE — 1159F MED LIST DOCD IN RCRD: CPT | Mod: CPTII,S$GLB,, | Performed by: ORTHOPAEDIC SURGERY

## 2023-04-05 PROCEDURE — 1159F PR MEDICATION LIST DOCUMENTED IN MEDICAL RECORD: ICD-10-PCS | Mod: CPTII,S$GLB,, | Performed by: ORTHOPAEDIC SURGERY

## 2023-04-05 PROCEDURE — 3008F BODY MASS INDEX DOCD: CPT | Mod: CPTII,S$GLB,, | Performed by: ORTHOPAEDIC SURGERY

## 2023-04-05 PROCEDURE — 1160F PR REVIEW ALL MEDS BY PRESCRIBER/CLIN PHARMACIST DOCUMENTED: ICD-10-PCS | Mod: CPTII,S$GLB,, | Performed by: ORTHOPAEDIC SURGERY

## 2023-04-05 NOTE — LETTER
April 5, 2023      [facility]  [address]  [place] [address]  Phone: [phone]  Fax: [phone]       Patient: Jarrod Lopez   YOB: 1980  Date of Visit: 04/05/2023    To Whom It May Concern:    Chandler Lopez  was at [facility] on 04/05/2023. The patient may return to work 04/05/23. If you have any questions or concerns, or if I can be of further assistance, please do not hesitate to contact me.    Sincerely,              Chas Mccarty MD

## 2023-04-05 NOTE — PROGRESS NOTES
Subjective:       Patient ID: Jarrod Lopez is a 42 y.o. male.    Chief Complaint: Pain of the Lumbar Spine (Here for Mri results Lumbar, has some pain in right leg at the moment)      History of Present Illness    Prior to meeting with the patient I reviewed the medical chart in [facility]. This included reviewing the previous progress notes from our office, review of the patient's last appointment with their primary care provider, review of any visits to the emergency room, and review of any pain management appointments or procedures.   Patient's lower back pain is better does have progressively increasing pain lateral aspect of both knees radiating to the calf areas no numbness.    Current Medications  Current Outpatient Medications   Medication Sig Dispense Refill    amLODIPine (NORVASC) 10 MG tablet Take 1 tablet (10 mg total) by mouth once daily. 30 tablet 3    FLUoxetine 20 MG capsule Take 1 capsule (20 mg total) by mouth once daily. 30 capsule 11    hydroCHLOROthiazide (HYDRODIURIL) 12.5 MG Tab Take 1 tablet (12.5 mg total) by mouth once daily. 30 tablet 2    ibuprofen (ADVIL,MOTRIN) 800 MG tablet Take 1 tablet (800 mg total) by mouth 3 (three) times daily as needed for Pain. 40 tablet 0    ibuprofen (ADVIL,MOTRIN) 800 MG tablet Take 1 tablet (800 mg total) by mouth 3 (three) times daily with meals. 90 tablet 5    losartan (COZAAR) 50 MG tablet Take 1 tablet (50 mg total) by mouth once daily. 90 tablet 3    multivitamin (THERAGRAN) per tablet Take 1 tablet by mouth once daily.      traMADoL (ULTRAM) 50 mg tablet Take 1 tablet (50 mg total) by mouth every 12 (twelve) hours as needed for Pain. (Patient not taking: Reported on 4/5/2023) 30 tablet 0     No current facility-administered medications for this visit.       Allergies  Review of patient's allergies indicates:  No Known Allergies    Past Medical History  Past Medical History:   Diagnosis Date    Hypertension        Surgical History  History reviewed. No  pertinent surgical history.    Family History:   Family History   Problem Relation Age of Onset    Hypertension Mother        Social History:   Social History     Socioeconomic History    Marital status: Single    Number of children: 5   Tobacco Use    Smoking status: Former     Types: Cigarettes    Smokeless tobacco: Never   Substance and Sexual Activity    Alcohol use: No    Drug use: Yes     Types: Marijuana    Sexual activity: Not Currently     Partners: Female       Hospitalization/Major Diagnostic Procedure:     Review of Systems     General/Constitutional:  Chills denies. Fatigue denies. Fever denies. Weight gain denies. Weight loss denies.    Respiratory:  Shortness of breath denies.    Cardiovascular:  Chest pain denies.    Gastrointestinal:  Constipation denies. Diarrhea denies. Nausea denies. Vomiting denies.     Hematology:  Easy bruising denies. Prolonged bleeding denies.     Genitourinary:  Frequent urination denies. Pain in lower back denies. Painful urination denies.     Musculoskeletal:  See HPI for details    Skin:  Rash denies.    Neurologic:  Dizziness denies. Gait abnormalities denies. Seizures denies. Tingling/Numbess denies.    Psychiatric:  Anxiety denies. Depressed mood denies.     Objective:   Vital Signs:   Vitals:    04/05/23 0811   BP: 132/82        Physical Exam      General Examination:     Constitutional: The patient is alert and oriented to lace person and time. Mood is pleasant.     Head/Face: Normal facial features normal eyebrows    Eyes: Normal extraocular motion bilaterally    Lungs: Respirations are equal and unlabored    Gait is coordinated.    Cardiovascular: There are no swelling or varicosities present.    Lymphatic: Negative for adenopathy    Skin: Normal    Neurological: Level of consciousness normal. Oriented to place person and time and situation    Psychiatric: Oriented to time place person and situation    Genu valgus both knees tenderness lateral joint line both  knees positive Augustin's test both knees lateral aspect no effusion of either knee.  XRAY Report/ Interpretation:  Lumbar MRI was discussed with patient's back pain is doing better moderate multilevel degenerative changes noted      Assessment:       1. Primary osteoarthritis of left knee    2. Primary osteoarthritis of right knee    3. Old tear of lateral meniscus of left knee, unspecified tear type    4. Old tear of lateral meniscus of right knee, unspecified tear type        Plan:       Jarrod was seen today for pain.    Diagnoses and all orders for this visit:    Primary osteoarthritis of left knee    Primary osteoarthritis of right knee    Old tear of lateral meniscus of left knee, unspecified tear type    Old tear of lateral meniscus of right knee, unspecified tear type         No follow-ups on file.    I suspect he has symptomatic lateral meniscus tear as of both knees caused by his valgus alignment of his knees and also because of his weight because of persistent symptoms we recommend the MRI studies of both knees return after MRIs.  Observe low back pain.  Treatment options were discussed with regards to the nature of the medical condition. Conservative pain intervention and surgical options were discussed in detail. The probability of success of each separate treatment option was discussed. The patient expressed a clear understanding of the treatment options. With regards to surgery, the procedure risk, benefits, complications, and outcomes were discussed. No guarantees were given with regards to surgical outcome.   The risk of complications, morbidity, and mortality of patient management decisions have been made at the time of this visit. These are associated with the patient's problems, diagnostic procedures and treatment options. This includes the possible management options selected and those considered but not selected by the patient after shared medical decision making we discussed with the patient.      This note was created using Dragon voice recognition software that occasionally misinterpreted phrases or words.

## 2023-04-19 ENCOUNTER — HOSPITAL ENCOUNTER (OUTPATIENT)
Dept: RADIOLOGY | Facility: HOSPITAL | Age: 43
Discharge: HOME | End: 2023-04-19
Attending: ORTHOPAEDIC SURGERY
Payer: COMMERCIAL

## 2023-04-19 DIAGNOSIS — M23.201 OLD TEAR OF LATERAL MENISCUS OF LEFT KNEE, UNSPECIFIED TEAR TYPE: ICD-10-CM

## 2023-04-19 DIAGNOSIS — M23.200 OLD TEAR OF LATERAL MENISCUS OF RIGHT KNEE, UNSPECIFIED TEAR TYPE: ICD-10-CM

## 2023-04-19 PROCEDURE — 73721 MRI JNT OF LWR EXTRE W/O DYE: CPT | Mod: TC,PO,RT

## 2023-04-19 PROCEDURE — 73721 MRI JNT OF LWR EXTRE W/O DYE: CPT | Mod: TC,PO,LT

## 2023-04-26 ENCOUNTER — OFFICE VISIT (OUTPATIENT)
Dept: FAMILY MEDICINE | Facility: CLINIC | Age: 43
End: 2023-04-26
Payer: COMMERCIAL

## 2023-04-26 ENCOUNTER — OFFICE VISIT (OUTPATIENT)
Dept: ORTHOPEDICS | Facility: CLINIC | Age: 43
End: 2023-04-26
Payer: COMMERCIAL

## 2023-04-26 VITALS
BODY MASS INDEX: 41.63 KG/M2 | RESPIRATION RATE: 20 BRPM | SYSTOLIC BLOOD PRESSURE: 160 MMHG | DIASTOLIC BLOOD PRESSURE: 92 MMHG | HEIGHT: 71 IN | HEART RATE: 100 BPM | TEMPERATURE: 98 F | WEIGHT: 297.38 LBS

## 2023-04-26 VITALS
WEIGHT: 305 LBS | BODY MASS INDEX: 42.7 KG/M2 | SYSTOLIC BLOOD PRESSURE: 124 MMHG | DIASTOLIC BLOOD PRESSURE: 74 MMHG | HEIGHT: 71 IN

## 2023-04-26 DIAGNOSIS — I10 ESSENTIAL HYPERTENSION: ICD-10-CM

## 2023-04-26 DIAGNOSIS — S83.241S ACUTE MEDIAL MENISCUS TEAR OF RIGHT KNEE, SEQUELA: ICD-10-CM

## 2023-04-26 DIAGNOSIS — S83.281S ACUTE LATERAL MENISCUS TEAR OF RIGHT KNEE, SEQUELA: ICD-10-CM

## 2023-04-26 DIAGNOSIS — M17.12 PRIMARY OSTEOARTHRITIS OF LEFT KNEE: ICD-10-CM

## 2023-04-26 DIAGNOSIS — M17.11 PRIMARY OSTEOARTHRITIS OF RIGHT KNEE: ICD-10-CM

## 2023-04-26 DIAGNOSIS — S83.282S ACUTE LATERAL MENISCUS TEAR OF LEFT KNEE, SEQUELA: Primary | ICD-10-CM

## 2023-04-26 PROCEDURE — 99214 OFFICE O/P EST MOD 30 MIN: CPT | Mod: S$GLB,,, | Performed by: NURSE PRACTITIONER

## 2023-04-26 PROCEDURE — 3008F BODY MASS INDEX DOCD: CPT | Mod: CPTII,S$GLB,, | Performed by: NURSE PRACTITIONER

## 2023-04-26 PROCEDURE — 3074F SYST BP LT 130 MM HG: CPT | Mod: CPTII,S$GLB,, | Performed by: ORTHOPAEDIC SURGERY

## 2023-04-26 PROCEDURE — 3074F PR MOST RECENT SYSTOLIC BLOOD PRESSURE < 130 MM HG: ICD-10-PCS | Mod: CPTII,S$GLB,, | Performed by: ORTHOPAEDIC SURGERY

## 2023-04-26 PROCEDURE — 1160F PR REVIEW ALL MEDS BY PRESCRIBER/CLIN PHARMACIST DOCUMENTED: ICD-10-PCS | Mod: CPTII,S$GLB,, | Performed by: ORTHOPAEDIC SURGERY

## 2023-04-26 PROCEDURE — 1159F MED LIST DOCD IN RCRD: CPT | Mod: CPTII,S$GLB,, | Performed by: ORTHOPAEDIC SURGERY

## 2023-04-26 PROCEDURE — 1159F PR MEDICATION LIST DOCUMENTED IN MEDICAL RECORD: ICD-10-PCS | Mod: CPTII,S$GLB,, | Performed by: ORTHOPAEDIC SURGERY

## 2023-04-26 PROCEDURE — 3044F PR MOST RECENT HEMOGLOBIN A1C LEVEL <7.0%: ICD-10-PCS | Mod: CPTII,S$GLB,, | Performed by: NURSE PRACTITIONER

## 2023-04-26 PROCEDURE — 1159F MED LIST DOCD IN RCRD: CPT | Mod: CPTII,S$GLB,, | Performed by: NURSE PRACTITIONER

## 2023-04-26 PROCEDURE — 4010F PR ACE/ARB THEARPY RXD/TAKEN: ICD-10-PCS | Mod: CPTII,S$GLB,, | Performed by: NURSE PRACTITIONER

## 2023-04-26 PROCEDURE — 99213 PR OFFICE/OUTPT VISIT, EST, LEVL III, 20-29 MIN: ICD-10-PCS | Mod: S$GLB,,, | Performed by: ORTHOPAEDIC SURGERY

## 2023-04-26 PROCEDURE — 99213 OFFICE O/P EST LOW 20 MIN: CPT | Mod: S$GLB,,, | Performed by: ORTHOPAEDIC SURGERY

## 2023-04-26 PROCEDURE — 4010F ACE/ARB THERAPY RXD/TAKEN: CPT | Mod: CPTII,S$GLB,, | Performed by: NURSE PRACTITIONER

## 2023-04-26 PROCEDURE — 3044F PR MOST RECENT HEMOGLOBIN A1C LEVEL <7.0%: ICD-10-PCS | Mod: CPTII,S$GLB,, | Performed by: ORTHOPAEDIC SURGERY

## 2023-04-26 PROCEDURE — 4010F ACE/ARB THERAPY RXD/TAKEN: CPT | Mod: CPTII,S$GLB,, | Performed by: ORTHOPAEDIC SURGERY

## 2023-04-26 PROCEDURE — 3044F HG A1C LEVEL LT 7.0%: CPT | Mod: CPTII,S$GLB,, | Performed by: NURSE PRACTITIONER

## 2023-04-26 PROCEDURE — 4010F PR ACE/ARB THEARPY RXD/TAKEN: ICD-10-PCS | Mod: CPTII,S$GLB,, | Performed by: ORTHOPAEDIC SURGERY

## 2023-04-26 PROCEDURE — 3008F BODY MASS INDEX DOCD: CPT | Mod: CPTII,S$GLB,, | Performed by: ORTHOPAEDIC SURGERY

## 2023-04-26 PROCEDURE — 3080F DIAST BP >= 90 MM HG: CPT | Mod: CPTII,S$GLB,, | Performed by: NURSE PRACTITIONER

## 2023-04-26 PROCEDURE — 3008F PR BODY MASS INDEX (BMI) DOCUMENTED: ICD-10-PCS | Mod: CPTII,S$GLB,, | Performed by: ORTHOPAEDIC SURGERY

## 2023-04-26 PROCEDURE — 1160F RVW MEDS BY RX/DR IN RCRD: CPT | Mod: CPTII,S$GLB,, | Performed by: ORTHOPAEDIC SURGERY

## 2023-04-26 PROCEDURE — 1159F PR MEDICATION LIST DOCUMENTED IN MEDICAL RECORD: ICD-10-PCS | Mod: CPTII,S$GLB,, | Performed by: NURSE PRACTITIONER

## 2023-04-26 PROCEDURE — 3077F PR MOST RECENT SYSTOLIC BLOOD PRESSURE >= 140 MM HG: ICD-10-PCS | Mod: CPTII,S$GLB,, | Performed by: NURSE PRACTITIONER

## 2023-04-26 PROCEDURE — 3080F PR MOST RECENT DIASTOLIC BLOOD PRESSURE >= 90 MM HG: ICD-10-PCS | Mod: CPTII,S$GLB,, | Performed by: NURSE PRACTITIONER

## 2023-04-26 PROCEDURE — 3078F DIAST BP <80 MM HG: CPT | Mod: CPTII,S$GLB,, | Performed by: ORTHOPAEDIC SURGERY

## 2023-04-26 PROCEDURE — 3044F HG A1C LEVEL LT 7.0%: CPT | Mod: CPTII,S$GLB,, | Performed by: ORTHOPAEDIC SURGERY

## 2023-04-26 PROCEDURE — 99214 PR OFFICE/OUTPT VISIT, EST, LEVL IV, 30-39 MIN: ICD-10-PCS | Mod: S$GLB,,, | Performed by: NURSE PRACTITIONER

## 2023-04-26 PROCEDURE — 3008F PR BODY MASS INDEX (BMI) DOCUMENTED: ICD-10-PCS | Mod: CPTII,S$GLB,, | Performed by: NURSE PRACTITIONER

## 2023-04-26 PROCEDURE — 3077F SYST BP >= 140 MM HG: CPT | Mod: CPTII,S$GLB,, | Performed by: NURSE PRACTITIONER

## 2023-04-26 PROCEDURE — 3078F PR MOST RECENT DIASTOLIC BLOOD PRESSURE < 80 MM HG: ICD-10-PCS | Mod: CPTII,S$GLB,, | Performed by: ORTHOPAEDIC SURGERY

## 2023-04-26 RX ORDER — HYDROCHLOROTHIAZIDE 25 MG/1
25 TABLET ORAL DAILY
Qty: 30 TABLET | Refills: 2 | Status: SHIPPED | OUTPATIENT
Start: 2023-04-26 | End: 2023-07-25

## 2023-04-26 RX ORDER — LOSARTAN POTASSIUM 100 MG/1
100 TABLET ORAL DAILY
Qty: 90 TABLET | Refills: 3 | Status: SHIPPED | OUTPATIENT
Start: 2023-04-26 | End: 2024-04-25

## 2023-04-26 NOTE — PROGRESS NOTES
Subjective:       Patient ID: Jarrod Lopez is a 42 y.o. male.    Chief Complaint: Pain of the Left Knee (Here for MRI results bilateral knees, still having pain depending on day) and Pain of the Right Knee (Here for MRI results bilateral knees, still having pain depending on day)      History of Present Illness    Prior to meeting with the patient I reviewed the medical chart in [facility]. This included reviewing the previous progress notes from our office, review of the patient's last appointment with their primary care provider, review of any visits to the emergency room, and review of any pain management appointments or procedures.   Patient returns reassess with bilateral painful knees left greater than right he discuss results MRIs more functional loss of activity with the left knee than the right.    Current Medications  Current Outpatient Medications   Medication Sig Dispense Refill    amLODIPine (NORVASC) 10 MG tablet Take 1 tablet (10 mg total) by mouth once daily. 30 tablet 3    FLUoxetine 20 MG capsule Take 1 capsule (20 mg total) by mouth once daily. 30 capsule 11    hydroCHLOROthiazide (HYDRODIURIL) 12.5 MG Tab Take 1 tablet (12.5 mg total) by mouth once daily. 30 tablet 2    ibuprofen (ADVIL,MOTRIN) 800 MG tablet Take 1 tablet (800 mg total) by mouth 3 (three) times daily as needed for Pain. 40 tablet 0    ibuprofen (ADVIL,MOTRIN) 800 MG tablet Take 1 tablet (800 mg total) by mouth 3 (three) times daily with meals. 90 tablet 5    losartan (COZAAR) 50 MG tablet Take 1 tablet (50 mg total) by mouth once daily. 90 tablet 3    multivitamin (THERAGRAN) per tablet Take 1 tablet by mouth once daily.      traMADoL (ULTRAM) 50 mg tablet Take 1 tablet (50 mg total) by mouth every 12 (twelve) hours as needed for Pain. (Patient not taking: Reported on 4/26/2023) 30 tablet 0     No current facility-administered medications for this visit.       Allergies  Review of patient's allergies indicates:  No Known  Allergies    Past Medical History  Past Medical History:   Diagnosis Date    Hypertension        Surgical History  History reviewed. No pertinent surgical history.    Family History:   Family History   Problem Relation Age of Onset    Hypertension Mother        Social History:   Social History     Socioeconomic History    Marital status: Single    Number of children: 5   Tobacco Use    Smoking status: Former     Types: Cigarettes    Smokeless tobacco: Never   Substance and Sexual Activity    Alcohol use: No    Drug use: Yes     Types: Marijuana    Sexual activity: Not Currently     Partners: Female       Hospitalization/Major Diagnostic Procedure:     Review of Systems     General/Constitutional:  Chills denies. Fatigue denies. Fever denies. Weight gain denies. Weight loss denies.    Respiratory:  Shortness of breath denies.    Cardiovascular:  Chest pain denies.    Gastrointestinal:  Constipation denies. Diarrhea denies. Nausea denies. Vomiting denies.     Hematology:  Easy bruising denies. Prolonged bleeding denies.     Genitourinary:  Frequent urination denies. Pain in lower back denies. Painful urination denies.     Musculoskeletal:  See HPI for details    Skin:  Rash denies.    Neurologic:  Dizziness denies. Gait abnormalities denies. Seizures denies. Tingling/Numbess denies.    Psychiatric:  Anxiety denies. Depressed mood denies.     Objective:   Vital Signs:   Vitals:    04/26/23 0815   BP: 124/74        Physical Exam      General Examination:     Constitutional: The patient is alert and oriented to lace person and time. Mood is pleasant.     Head/Face: Normal facial features normal eyebrows    Eyes: Normal extraocular motion bilaterally    Lungs: Respirations are equal and unlabored    Gait is coordinated.    Cardiovascular: There are no swelling or varicosities present.    Lymphatic: Negative for adenopathy    Skin: Normal    Neurological: Level of consciousness normal. Oriented to place person and time and  situation    Psychiatric: Oriented to time place person and situation    Valgus alignment both knees noted tenderness lateral joint line right knee crepitance positive Augustin's test bilaterally no instability.  XRAY Report/ Interpretation:  MRI reports both knees were reviewed lateral meniscus tear is both knees medial meniscus tear right knee early osteoarthritis of both knees      Assessment:       1. Acute lateral meniscus tear of left knee, sequela    2. Primary osteoarthritis of left knee    3. Primary osteoarthritis of right knee    4. Acute medial meniscus tear of right knee, sequela    5. Acute lateral meniscus tear of right knee, sequela        Plan:       Jarrod was seen today for pain and pain.    Diagnoses and all orders for this visit:    Acute lateral meniscus tear of left knee, sequela    Primary osteoarthritis of left knee    Primary osteoarthritis of right knee    Acute medial meniscus tear of right knee, sequela    Acute lateral meniscus tear of right knee, sequela         Follow up for Surgery.    Treatment options were discussed patient like to address his symptoms of the left knee with plan arthroscopic lateral meniscectomy we talked about the realistic expectations of surgery should we get rid of his mechanical pain with the lateral meniscectomy if he has persistent pain after surgery he might be a candidate for viscosupplementation to treat his early osteoarthritis of his left knee.  He has 2 young having knee replacement and he agrees.    Treatment options were discussed with regards to the nature of the medical condition. Conservative pain intervention and surgical options were discussed in detail. The probability of success of each separate treatment option was discussed. The patient expressed a clear understanding of the treatment options. With regards to surgery, the procedure risk, benefits, complications, and outcomes were discussed. No guarantees were given with regards to surgical  outcome.   The risk of complications, morbidity, and mortality of patient management decisions have been made at the time of this visit. These are associated with the patient's problems, diagnostic procedures and treatment options. This includes the possible management options selected and those considered but not selected by the patient after shared medical decision making we discussed with the patient.     This note was created using Dragon voice recognition software that occasionally misinterpreted phrases or words.

## 2023-04-26 NOTE — H&P (VIEW-ONLY)
Subjective:       Patient ID: Jarrod Lopez is a 42 y.o. male.    Chief Complaint: Pain of the Left Knee (Here for MRI results bilateral knees, still having pain depending on day) and Pain of the Right Knee (Here for MRI results bilateral knees, still having pain depending on day)      History of Present Illness    Prior to meeting with the patient I reviewed the medical chart in [facility]. This included reviewing the previous progress notes from our office, review of the patient's last appointment with their primary care provider, review of any visits to the emergency room, and review of any pain management appointments or procedures.   Patient returns reassess with bilateral painful knees left greater than right he discuss results MRIs more functional loss of activity with the left knee than the right.    Current Medications  Current Outpatient Medications   Medication Sig Dispense Refill    amLODIPine (NORVASC) 10 MG tablet Take 1 tablet (10 mg total) by mouth once daily. 30 tablet 3    FLUoxetine 20 MG capsule Take 1 capsule (20 mg total) by mouth once daily. 30 capsule 11    hydroCHLOROthiazide (HYDRODIURIL) 12.5 MG Tab Take 1 tablet (12.5 mg total) by mouth once daily. 30 tablet 2    ibuprofen (ADVIL,MOTRIN) 800 MG tablet Take 1 tablet (800 mg total) by mouth 3 (three) times daily as needed for Pain. 40 tablet 0    ibuprofen (ADVIL,MOTRIN) 800 MG tablet Take 1 tablet (800 mg total) by mouth 3 (three) times daily with meals. 90 tablet 5    losartan (COZAAR) 50 MG tablet Take 1 tablet (50 mg total) by mouth once daily. 90 tablet 3    multivitamin (THERAGRAN) per tablet Take 1 tablet by mouth once daily.      traMADoL (ULTRAM) 50 mg tablet Take 1 tablet (50 mg total) by mouth every 12 (twelve) hours as needed for Pain. (Patient not taking: Reported on 4/26/2023) 30 tablet 0     No current facility-administered medications for this visit.       Allergies  Review of patient's allergies indicates:  No Known  Allergies    Past Medical History  Past Medical History:   Diagnosis Date    Hypertension        Surgical History  History reviewed. No pertinent surgical history.    Family History:   Family History   Problem Relation Age of Onset    Hypertension Mother        Social History:   Social History     Socioeconomic History    Marital status: Single    Number of children: 5   Tobacco Use    Smoking status: Former     Types: Cigarettes    Smokeless tobacco: Never   Substance and Sexual Activity    Alcohol use: No    Drug use: Yes     Types: Marijuana    Sexual activity: Not Currently     Partners: Female       Hospitalization/Major Diagnostic Procedure:     Review of Systems     General/Constitutional:  Chills denies. Fatigue denies. Fever denies. Weight gain denies. Weight loss denies.    Respiratory:  Shortness of breath denies.    Cardiovascular:  Chest pain denies.    Gastrointestinal:  Constipation denies. Diarrhea denies. Nausea denies. Vomiting denies.     Hematology:  Easy bruising denies. Prolonged bleeding denies.     Genitourinary:  Frequent urination denies. Pain in lower back denies. Painful urination denies.     Musculoskeletal:  See HPI for details    Skin:  Rash denies.    Neurologic:  Dizziness denies. Gait abnormalities denies. Seizures denies. Tingling/Numbess denies.    Psychiatric:  Anxiety denies. Depressed mood denies.     Objective:   Vital Signs:   Vitals:    04/26/23 0815   BP: 124/74        Physical Exam      General Examination:     Constitutional: The patient is alert and oriented to lace person and time. Mood is pleasant.     Head/Face: Normal facial features normal eyebrows    Eyes: Normal extraocular motion bilaterally    Lungs: Respirations are equal and unlabored    Gait is coordinated.    Cardiovascular: There are no swelling or varicosities present.    Lymphatic: Negative for adenopathy    Skin: Normal    Neurological: Level of consciousness normal. Oriented to place person and time and  situation    Psychiatric: Oriented to time place person and situation    Valgus alignment both knees noted tenderness lateral joint line right knee crepitance positive Augustin's test bilaterally no instability.  XRAY Report/ Interpretation:  MRI reports both knees were reviewed lateral meniscus tear is both knees medial meniscus tear right knee early osteoarthritis of both knees      Assessment:       1. Acute lateral meniscus tear of left knee, sequela    2. Primary osteoarthritis of left knee    3. Primary osteoarthritis of right knee    4. Acute medial meniscus tear of right knee, sequela    5. Acute lateral meniscus tear of right knee, sequela        Plan:       Jarrod was seen today for pain and pain.    Diagnoses and all orders for this visit:    Acute lateral meniscus tear of left knee, sequela    Primary osteoarthritis of left knee    Primary osteoarthritis of right knee    Acute medial meniscus tear of right knee, sequela    Acute lateral meniscus tear of right knee, sequela         Follow up for Surgery.    Treatment options were discussed patient like to address his symptoms of the left knee with plan arthroscopic lateral meniscectomy we talked about the realistic expectations of surgery should we get rid of his mechanical pain with the lateral meniscectomy if he has persistent pain after surgery he might be a candidate for viscosupplementation to treat his early osteoarthritis of his left knee.  He has 2 young having knee replacement and he agrees.    Treatment options were discussed with regards to the nature of the medical condition. Conservative pain intervention and surgical options were discussed in detail. The probability of success of each separate treatment option was discussed. The patient expressed a clear understanding of the treatment options. With regards to surgery, the procedure risk, benefits, complications, and outcomes were discussed. No guarantees were given with regards to surgical  outcome.   The risk of complications, morbidity, and mortality of patient management decisions have been made at the time of this visit. These are associated with the patient's problems, diagnostic procedures and treatment options. This includes the possible management options selected and those considered but not selected by the patient after shared medical decision making we discussed with the patient.     This note was created using Dragon voice recognition software that occasionally misinterpreted phrases or words.

## 2023-05-01 ENCOUNTER — OFFICE VISIT (OUTPATIENT)
Dept: FAMILY MEDICINE | Facility: CLINIC | Age: 43
End: 2023-05-01
Payer: COMMERCIAL

## 2023-05-01 VITALS
SYSTOLIC BLOOD PRESSURE: 122 MMHG | TEMPERATURE: 99 F | DIASTOLIC BLOOD PRESSURE: 88 MMHG | HEART RATE: 84 BPM | RESPIRATION RATE: 20 BRPM | HEIGHT: 71 IN | WEIGHT: 286.31 LBS | BODY MASS INDEX: 40.08 KG/M2

## 2023-05-01 DIAGNOSIS — J06.9 UPPER RESPIRATORY TRACT INFECTION, UNSPECIFIED TYPE: Primary | ICD-10-CM

## 2023-05-01 DIAGNOSIS — G44.83 PRIMARY COUGH HEADACHE: ICD-10-CM

## 2023-05-01 PROCEDURE — 3008F BODY MASS INDEX DOCD: CPT | Mod: CPTII,S$GLB,, | Performed by: NURSE PRACTITIONER

## 2023-05-01 PROCEDURE — 3079F PR MOST RECENT DIASTOLIC BLOOD PRESSURE 80-89 MM HG: ICD-10-PCS | Mod: CPTII,S$GLB,, | Performed by: NURSE PRACTITIONER

## 2023-05-01 PROCEDURE — 1159F PR MEDICATION LIST DOCUMENTED IN MEDICAL RECORD: ICD-10-PCS | Mod: CPTII,S$GLB,, | Performed by: NURSE PRACTITIONER

## 2023-05-01 PROCEDURE — 3074F PR MOST RECENT SYSTOLIC BLOOD PRESSURE < 130 MM HG: ICD-10-PCS | Mod: CPTII,S$GLB,, | Performed by: NURSE PRACTITIONER

## 2023-05-01 PROCEDURE — 3008F PR BODY MASS INDEX (BMI) DOCUMENTED: ICD-10-PCS | Mod: CPTII,S$GLB,, | Performed by: NURSE PRACTITIONER

## 2023-05-01 PROCEDURE — 99213 OFFICE O/P EST LOW 20 MIN: CPT | Mod: S$GLB,,, | Performed by: NURSE PRACTITIONER

## 2023-05-01 PROCEDURE — 99213 PR OFFICE/OUTPT VISIT, EST, LEVL III, 20-29 MIN: ICD-10-PCS | Mod: S$GLB,,, | Performed by: NURSE PRACTITIONER

## 2023-05-01 PROCEDURE — 4010F ACE/ARB THERAPY RXD/TAKEN: CPT | Mod: CPTII,S$GLB,, | Performed by: NURSE PRACTITIONER

## 2023-05-01 PROCEDURE — 3079F DIAST BP 80-89 MM HG: CPT | Mod: CPTII,S$GLB,, | Performed by: NURSE PRACTITIONER

## 2023-05-01 PROCEDURE — 1159F MED LIST DOCD IN RCRD: CPT | Mod: CPTII,S$GLB,, | Performed by: NURSE PRACTITIONER

## 2023-05-01 PROCEDURE — 3044F HG A1C LEVEL LT 7.0%: CPT | Mod: CPTII,S$GLB,, | Performed by: NURSE PRACTITIONER

## 2023-05-01 PROCEDURE — 3044F PR MOST RECENT HEMOGLOBIN A1C LEVEL <7.0%: ICD-10-PCS | Mod: CPTII,S$GLB,, | Performed by: NURSE PRACTITIONER

## 2023-05-01 PROCEDURE — 4010F PR ACE/ARB THEARPY RXD/TAKEN: ICD-10-PCS | Mod: CPTII,S$GLB,, | Performed by: NURSE PRACTITIONER

## 2023-05-01 PROCEDURE — 3074F SYST BP LT 130 MM HG: CPT | Mod: CPTII,S$GLB,, | Performed by: NURSE PRACTITIONER

## 2023-05-01 RX ORDER — BUTALBITAL, ACETAMINOPHEN AND CAFFEINE 50; 325; 40 MG/1; MG/1; MG/1
1 TABLET ORAL EVERY 4 HOURS PRN
Qty: 30 TABLET | Refills: 1 | Status: SHIPPED | OUTPATIENT
Start: 2023-05-01 | End: 2023-05-03 | Stop reason: ALTCHOICE

## 2023-05-01 RX ORDER — DOXYCYCLINE 100 MG/1
100 CAPSULE ORAL 2 TIMES DAILY
Qty: 10 CAPSULE | Refills: 0 | Status: SHIPPED | OUTPATIENT
Start: 2023-05-01 | End: 2023-05-06

## 2023-05-01 NOTE — PROGRESS NOTES
Patient ID: Jarrod Lopez is a 42 y.o. male.    Chief Complaint: Headache and Chills (43 yo male c/o constant sweats/chills with headaches and fatigue times 3 days. Pt state symptoms started after taking HCTZ 25 mg increased dose. No cough, fever, or sore throat or light headed. KM)    Mr. Lopez presents today for new headache and new onset diaphoresis. He states he took medication twice in one day for Blood pressure over the weekend and that is when symptoms began. He noticed the headache when he would cough and sweating is present throughout the day and even while in office. He denies any new medication and he also denies any sick contact. He states he will resume blood pressure medication as ordered. Blood pressure has improved significantly in the interval.     Headache   This is a new problem. The current episode started in the past 7 days. The problem occurs constantly. The problem has been waxing and waning. The pain is located in the Bilateral region. The pain radiates to the face. The pain quality is similar to prior headaches. The quality of the pain is described as aching. The pain is at a severity of 5/10. The pain is moderate. Associated symptoms include facial sweating and sinus pressure. Pertinent negatives include no fever. The symptoms are aggravated by coughing and sneezing. He has tried acetaminophen and darkened room for the symptoms. The treatment provided mild relief. His past medical history is significant for obesity.         Past Medical History:   Diagnosis Date    Hypertension      History reviewed. No pertinent surgical history.      Tobacco History:  reports that he has quit smoking. His smoking use included cigarettes. He has never used smokeless tobacco.      Review of patient's allergies indicates:  No Known Allergies    Current Outpatient Medications:     amLODIPine (NORVASC) 10 MG tablet, Take 1 tablet (10 mg total) by mouth once daily., Disp: 30 tablet, Rfl: 3    FLUoxetine 20 MG  "capsule, Take 1 capsule (20 mg total) by mouth once daily., Disp: 30 capsule, Rfl: 11    hydroCHLOROthiazide (HYDRODIURIL) 25 MG tablet, Take 1 tablet (25 mg total) by mouth once daily., Disp: 30 tablet, Rfl: 2    losartan (COZAAR) 100 MG tablet, Take 1 tablet (100 mg total) by mouth once daily., Disp: 90 tablet, Rfl: 3    multivitamin (THERAGRAN) per tablet, Take 1 tablet by mouth once daily., Disp: , Rfl:     butalbital-acetaminophen-caffeine -40 mg (FIORICET, ESGIC) -40 mg per tablet, Take 1 tablet by mouth every 4 (four) hours as needed for Pain., Disp: 30 tablet, Rfl: 1    doxycycline (MONODOX) 100 MG capsule, Take 1 capsule (100 mg total) by mouth 2 (two) times daily. for 5 days, Disp: 10 capsule, Rfl: 0    traMADoL (ULTRAM) 50 mg tablet, Take 1 tablet (50 mg total) by mouth every 12 (twelve) hours as needed for Pain. (Patient not taking: Reported on 4/26/2023), Disp: 30 tablet, Rfl: 0    Review of Systems   Constitutional:  Positive for diaphoresis and fatigue. Negative for fever.   HENT:  Positive for congestion and sinus pressure.    Musculoskeletal:  Positive for arthralgias.   Neurological:  Positive for headaches.        Objective:      Vitals:    05/01/23 0912 05/01/23 0935   BP: (!) 128/90 122/88   Pulse: 84    Resp: 20    Temp: 98.7 °F (37.1 °C)    TempSrc: Oral    Weight: 129.9 kg (286 lb 4.8 oz)    Height: 5' 11" (1.803 m)      Physical Exam  Constitutional:       Appearance: He is obese. He is diaphoretic.         Assessment:       1. Upper respiratory tract infection, unspecified type    2. Primary cough headache           Plan:       Upper respiratory tract infection, unspecified type  -     doxycycline (MONODOX) 100 MG capsule; Take 1 capsule (100 mg total) by mouth 2 (two) times daily. for 5 days  Dispense: 10 capsule; Refill: 0    Primary cough headache  -     butalbital-acetaminophen-caffeine -40 mg (FIORICET, ESGIC) -40 mg per tablet; Take 1 tablet by mouth every 4 " (four) hours as needed for Pain.  Dispense: 30 tablet; Refill: 1      Follow up if symptoms worsen or fail to improve.        5/1/2023 Mirella Cleaning, NP

## 2023-05-02 DIAGNOSIS — S83.282S ACUTE LATERAL MENISCUS TEAR OF LEFT KNEE, SEQUELA: Primary | ICD-10-CM

## 2023-05-03 ENCOUNTER — HOSPITAL ENCOUNTER (OUTPATIENT)
Dept: PREADMISSION TESTING | Facility: HOSPITAL | Age: 43
Discharge: HOME | End: 2023-05-03
Attending: ORTHOPAEDIC SURGERY
Payer: COMMERCIAL

## 2023-05-03 ENCOUNTER — ANESTHESIA EVENT (OUTPATIENT)
Dept: SURGERY | Facility: HOSPITAL | Age: 43
End: 2023-05-03
Payer: COMMERCIAL

## 2023-05-03 ENCOUNTER — HOSPITAL ENCOUNTER (OUTPATIENT)
Dept: RADIOLOGY | Facility: HOSPITAL | Age: 43
Discharge: HOME | End: 2023-05-03
Attending: ORTHOPAEDIC SURGERY
Payer: COMMERCIAL

## 2023-05-03 VITALS — BODY MASS INDEX: 40.04 KG/M2 | WEIGHT: 286 LBS | HEIGHT: 71 IN

## 2023-05-03 DIAGNOSIS — Z01.818 PRE-OPERATIVE EXAM: ICD-10-CM

## 2023-05-03 DIAGNOSIS — S83.282S ACUTE LATERAL MENISCUS TEAR OF LEFT KNEE, SEQUELA: ICD-10-CM

## 2023-05-03 LAB
ALBUMIN SERPL BCP-MCNC: 3.9 G/DL (ref 3.5–5.2)
ALP SERPL-CCNC: 72 U/L (ref 55–135)
ALT SERPL W/O P-5'-P-CCNC: 19 U/L (ref 10–44)
ANION GAP SERPL CALC-SCNC: 14 MMOL/L (ref 8–16)
AST SERPL-CCNC: 23 U/L (ref 10–40)
BASOPHILS # BLD AUTO: 0.02 K/UL (ref 0–0.2)
BASOPHILS NFR BLD: 0.2 % (ref 0–1.9)
BILIRUB SERPL-MCNC: 0.4 MG/DL (ref 0.1–1)
BUN SERPL-MCNC: 23 MG/DL (ref 6–20)
CALCIUM SERPL-MCNC: 9.6 MG/DL (ref 8.7–10.5)
CHLORIDE SERPL-SCNC: 101 MMOL/L (ref 95–110)
CO2 SERPL-SCNC: 23 MMOL/L (ref 23–29)
CREAT SERPL-MCNC: 1.5 MG/DL (ref 0.5–1.4)
DIFFERENTIAL METHOD: ABNORMAL
EOSINOPHIL # BLD AUTO: 0 K/UL (ref 0–0.5)
EOSINOPHIL NFR BLD: 0.4 % (ref 0–8)
ERYTHROCYTE [DISTWIDTH] IN BLOOD BY AUTOMATED COUNT: 14.5 % (ref 11.5–14.5)
EST. GFR  (NO RACE VARIABLE): 59 ML/MIN/1.73 M^2
GLUCOSE SERPL-MCNC: 96 MG/DL (ref 70–110)
HCT VFR BLD AUTO: 46.8 % (ref 40–54)
HGB BLD-MCNC: 15.3 G/DL (ref 14–18)
IMM GRANULOCYTES # BLD AUTO: 0.07 K/UL (ref 0–0.04)
IMM GRANULOCYTES NFR BLD AUTO: 0.8 % (ref 0–0.5)
LYMPHOCYTES # BLD AUTO: 2.8 K/UL (ref 1–4.8)
LYMPHOCYTES NFR BLD: 33.4 % (ref 18–48)
MCH RBC QN AUTO: 27 PG (ref 27–31)
MCHC RBC AUTO-ENTMCNC: 32.7 G/DL (ref 32–36)
MCV RBC AUTO: 83 FL (ref 82–98)
MONOCYTES # BLD AUTO: 0.8 K/UL (ref 0.3–1)
MONOCYTES NFR BLD: 9.6 % (ref 4–15)
NEUTROPHILS # BLD AUTO: 4.7 K/UL (ref 1.8–7.7)
NEUTROPHILS NFR BLD: 55.6 % (ref 38–73)
NRBC BLD-RTO: 0 /100 WBC
PLATELET # BLD AUTO: 253 K/UL (ref 150–450)
PMV BLD AUTO: 10.8 FL (ref 9.2–12.9)
POTASSIUM SERPL-SCNC: 4 MMOL/L (ref 3.5–5.1)
PROT SERPL-MCNC: 8 G/DL (ref 6–8.4)
RBC # BLD AUTO: 5.67 M/UL (ref 4.6–6.2)
SODIUM SERPL-SCNC: 138 MMOL/L (ref 136–145)
WBC # BLD AUTO: 8.41 K/UL (ref 3.9–12.7)

## 2023-05-03 PROCEDURE — 71046 X-RAY EXAM CHEST 2 VIEWS: CPT | Mod: TC,FY

## 2023-05-03 PROCEDURE — 93010 ELECTROCARDIOGRAM REPORT: CPT | Mod: ,,, | Performed by: INTERNAL MEDICINE

## 2023-05-03 PROCEDURE — 93010 EKG 12-LEAD: ICD-10-PCS | Mod: ,,, | Performed by: INTERNAL MEDICINE

## 2023-05-03 PROCEDURE — 80053 COMPREHEN METABOLIC PANEL: CPT | Performed by: ORTHOPAEDIC SURGERY

## 2023-05-03 PROCEDURE — 36415 COLL VENOUS BLD VENIPUNCTURE: CPT | Performed by: ORTHOPAEDIC SURGERY

## 2023-05-03 PROCEDURE — 93005 ELECTROCARDIOGRAM TRACING: CPT

## 2023-05-03 PROCEDURE — 71046 X-RAY EXAM CHEST 2 VIEWS: CPT | Mod: 26,,, | Performed by: RADIOLOGY

## 2023-05-03 PROCEDURE — 71046 XR CHEST PA AND LATERAL: ICD-10-PCS | Mod: 26,,, | Performed by: RADIOLOGY

## 2023-05-03 PROCEDURE — 99900103 DSU ONLY-NO CHARGE-INITIAL HR (STAT)

## 2023-05-03 PROCEDURE — 85025 COMPLETE CBC W/AUTO DIFF WBC: CPT | Performed by: ORTHOPAEDIC SURGERY

## 2023-05-03 PROCEDURE — 99900104 DSU ONLY-NO CHARGE-EA ADD'L HR (STAT)

## 2023-05-03 NOTE — DISCHARGE INSTRUCTIONS
To confirm, Your doctor has instructed you that surgery is scheduled for: 5/11/23    Please report to [facility], Registration the morning of surgery. You must check-in and receive a wristband before going to your procedure.    Pre-Op will call the afternoon prior to surgery between 1:00 and 6:00 PM with the final arrival time.  Phone number: [phone]    PLEASE NOTE:  The surgery schedule has many variables which may affect the time of your surgery case.  Family members should be available if your surgery time changes.  Plan to be here the day of your procedure between 4-6 hours.    MEDICATIONS:  TAKE ONLY THESE MEDICATIONS WITH A SMALL SIP OF WATER THE MORNING OF YOUR PROCEDURE:    SEE MED LIST        DO NOT TAKE THESE MEDICATIONS 5-7 DAYS PRIOR to your procedure or per your surgeon's request:   ASPIRIN, ALEVE, ADVIL, IBUPROFEN, FISH OIL VITAMIN E, HERBALS  (May take Tylenol)    ONLY if you are prescribed any types of blood thinners such as:  Aspirin, Coumadin, Plavix, Pradaxa, Xarelto, Aggrenox, Effient, Eliquis, Savasya, Brilinta, or any other, ask your surgeon whether you should stop taking them and how long before surgery you should stop.  You may also need to verify with the prescribing physician if it is ok to stop your medication.      INSTRUCTIONS IMPORTANT!!  Do not eat or drink anything between midnight and the time of your procedure- this includes gum, mints, and candy.  Do not smoke or drink alcoholic beverages 24 hours prior to your procedure.  Shower the night before AND the morning of your procedure with a Chlorhexidine wash such as Hibiclens or Dial antibacterial soap from the neck down.  Do not get it on your face or in your eyes.  You may use your own shampoo and face wash. This helps your skin to be as bacteria free as possible.    If you wear contact lenses, dentures, hearing aids or glasses, bring a container to put them in during surgery and give to a family member  for safe keeping.  Please leave all jewelry, piercing's and valuables at home. You must remove your false eyelashes prior to surgery.    DO NOT remove hair from the surgery site.  Do not shave the incision site unless you are given specific instructions to do so.    ONLY if you have been diagnosed with sleep apnea please bring your C-PAP machine.  ONLY if you wear home oxygen please bring your portable oxygen tank the day of your procedure.  ONLY if you have a history of OPEN HEART SURGERY you will need a clearance from your Cardiologist per Anesthesia.      ONLY for patients requiring bowel prep, written instructions will be given by your doctor's office.  ONLY if you have a neuro stimulator, please bring the controller with you the morning of surgery  ONLY if a type and screen test is needed before surgery, please return:  If your doctor has scheduled you for an overnight stay, bring a small overnight bag with any personal items you need.  Make arrangements in advance for transportation home by a responsible adult.  It is not safe to drive a vehicle during the 24 hours after anesthesia.      [facility] Visitor Policy    Effective September 26, 2022    [facility] will resume routine visitation for COVID-19 negative patients, including inpatients, outpatients, and procedural areas, in accordance with local campus procedures.    All [facility] facilities and properties are tobacco free.  Smoking is NOT allowed.   If you have any questions about these instructions, call Pre-Op Admit  Nursing at [phone] or the Pre-Op Day Surgery Unit at [phone].

## 2023-05-11 ENCOUNTER — ANESTHESIA (OUTPATIENT)
Dept: SURGERY | Facility: HOSPITAL | Age: 43
End: 2023-05-11
Payer: COMMERCIAL

## 2023-05-15 ENCOUNTER — PATIENT MESSAGE (OUTPATIENT)
Dept: ORTHOPEDICS | Facility: CLINIC | Age: 43
End: 2023-05-15

## 2023-05-16 ENCOUNTER — HOSPITAL ENCOUNTER (OUTPATIENT)
Facility: HOSPITAL | Age: 43
Discharge: HOME | End: 2023-05-16
Attending: ORTHOPAEDIC SURGERY | Admitting: ORTHOPAEDIC SURGERY
Payer: COMMERCIAL

## 2023-05-16 DIAGNOSIS — S83.282S ACUTE LATERAL MENISCUS TEAR OF LEFT KNEE, SEQUELA: ICD-10-CM

## 2023-05-16 PROBLEM — S83.282A ACUTE LATERAL MENISCUS TEAR OF LEFT KNEE: Status: ACTIVE | Noted: 2023-05-16

## 2023-05-16 PROBLEM — S83.282A ACUTE LATERAL MENISCUS TEAR OF LEFT KNEE: Status: RESOLVED | Noted: 2023-05-16 | Resolved: 2023-05-16

## 2023-05-16 PROCEDURE — 36000711: Performed by: ORTHOPAEDIC SURGERY

## 2023-05-16 PROCEDURE — 99900104 DSU ONLY-NO CHARGE-EA ADD'L HR (STAT): Performed by: ORTHOPAEDIC SURGERY

## 2023-05-16 PROCEDURE — 71000039 HC RECOVERY, EACH ADD'L HOUR: Performed by: ORTHOPAEDIC SURGERY

## 2023-05-16 PROCEDURE — 63600175 PHARM REV CODE 636 W HCPCS: Performed by: NURSE ANESTHETIST, CERTIFIED REGISTERED

## 2023-05-16 PROCEDURE — 63600175 PHARM REV CODE 636 W HCPCS: Performed by: ANESTHESIOLOGY

## 2023-05-16 PROCEDURE — 27200651 HC AIRWAY, LMA: Performed by: ANESTHESIOLOGY

## 2023-05-16 PROCEDURE — D9220A PRA ANESTHESIA: Mod: CRNA,,, | Performed by: NURSE ANESTHETIST, CERTIFIED REGISTERED

## 2023-05-16 PROCEDURE — 63600175 PHARM REV CODE 636 W HCPCS: Performed by: ORTHOPAEDIC SURGERY

## 2023-05-16 PROCEDURE — 71000015 HC POSTOP RECOV 1ST HR: Performed by: ORTHOPAEDIC SURGERY

## 2023-05-16 PROCEDURE — D9220A PRA ANESTHESIA: Mod: ANES,,, | Performed by: ANESTHESIOLOGY

## 2023-05-16 PROCEDURE — 99900103 DSU ONLY-NO CHARGE-INITIAL HR (STAT): Performed by: ORTHOPAEDIC SURGERY

## 2023-05-16 PROCEDURE — 25000003 PHARM REV CODE 250: Performed by: ANESTHESIOLOGY

## 2023-05-16 PROCEDURE — 25000003 PHARM REV CODE 250: Performed by: NURSE ANESTHETIST, CERTIFIED REGISTERED

## 2023-05-16 PROCEDURE — 71000033 HC RECOVERY, INTIAL HOUR: Performed by: ORTHOPAEDIC SURGERY

## 2023-05-16 PROCEDURE — 25000003 PHARM REV CODE 250: Performed by: ORTHOPAEDIC SURGERY

## 2023-05-16 PROCEDURE — 36000710: Performed by: ORTHOPAEDIC SURGERY

## 2023-05-16 PROCEDURE — 27201423 OPTIME MED/SURG SUP & DEVICES STERILE SUPPLY: Performed by: ORTHOPAEDIC SURGERY

## 2023-05-16 PROCEDURE — D9220A PRA ANESTHESIA: ICD-10-PCS | Mod: ANES,,, | Performed by: ANESTHESIOLOGY

## 2023-05-16 PROCEDURE — 37000009 HC ANESTHESIA EA ADD 15 MINS: Performed by: ORTHOPAEDIC SURGERY

## 2023-05-16 PROCEDURE — 94799 UNLISTED PULMONARY SVC/PX: CPT

## 2023-05-16 PROCEDURE — D9220A PRA ANESTHESIA: ICD-10-PCS | Mod: CRNA,,, | Performed by: NURSE ANESTHETIST, CERTIFIED REGISTERED

## 2023-05-16 PROCEDURE — 37000008 HC ANESTHESIA 1ST 15 MINUTES: Performed by: ORTHOPAEDIC SURGERY

## 2023-05-16 RX ORDER — OXYCODONE HYDROCHLORIDE 10 MG/1
10 TABLET ORAL EVERY 4 HOURS PRN
Status: CANCELLED | OUTPATIENT
Start: 2023-05-16

## 2023-05-16 RX ORDER — KETAMINE HYDROCHLORIDE 100 MG/ML
INJECTION, SOLUTION INTRAMUSCULAR; INTRAVENOUS
Status: DISCONTINUED | OUTPATIENT
Start: 2023-05-16 | End: 2023-05-16

## 2023-05-16 RX ORDER — HYDROCHLOROTHIAZIDE 25 MG/1
25 TABLET ORAL ONCE AS NEEDED
Status: COMPLETED | OUTPATIENT
Start: 2023-05-16 | End: 2023-05-16

## 2023-05-16 RX ORDER — ACETAMINOPHEN 10 MG/ML
INJECTION, SOLUTION INTRAVENOUS
Status: DISCONTINUED | OUTPATIENT
Start: 2023-05-16 | End: 2023-05-16

## 2023-05-16 RX ORDER — ONDANSETRON 4 MG/1
8 TABLET, ORALLY DISINTEGRATING ORAL EVERY 8 HOURS PRN
Status: CANCELLED | OUTPATIENT
Start: 2023-05-16

## 2023-05-16 RX ORDER — FENTANYL CITRATE 50 UG/ML
25 INJECTION, SOLUTION INTRAMUSCULAR; INTRAVENOUS EVERY 5 MIN PRN
Status: DISCONTINUED | OUTPATIENT
Start: 2023-05-16 | End: 2023-05-16 | Stop reason: HOSPADM

## 2023-05-16 RX ORDER — ONDANSETRON 2 MG/ML
4 INJECTION INTRAMUSCULAR; INTRAVENOUS ONCE AS NEEDED
Status: DISCONTINUED | OUTPATIENT
Start: 2023-05-16 | End: 2023-05-16 | Stop reason: HOSPADM

## 2023-05-16 RX ORDER — MIDAZOLAM HYDROCHLORIDE 1 MG/ML
INJECTION INTRAMUSCULAR; INTRAVENOUS
Status: DISCONTINUED | OUTPATIENT
Start: 2023-05-16 | End: 2023-05-16

## 2023-05-16 RX ORDER — SODIUM CHLORIDE 0.9 % (FLUSH) 0.9 %
10 SYRINGE (ML) INJECTION
Status: DISCONTINUED | OUTPATIENT
Start: 2023-05-16 | End: 2023-05-16 | Stop reason: HOSPADM

## 2023-05-16 RX ORDER — OXYCODONE AND ACETAMINOPHEN 5; 325 MG/1; MG/1
1 TABLET ORAL EVERY 4 HOURS PRN
Qty: 40 TABLET | Refills: 0 | Status: SHIPPED | OUTPATIENT
Start: 2023-05-16 | End: 2023-05-23

## 2023-05-16 RX ORDER — DEXAMETHASONE SODIUM PHOSPHATE 4 MG/ML
INJECTION, SOLUTION INTRA-ARTICULAR; INTRALESIONAL; INTRAMUSCULAR; INTRAVENOUS; SOFT TISSUE
Status: DISCONTINUED | OUTPATIENT
Start: 2023-05-16 | End: 2023-05-16

## 2023-05-16 RX ORDER — LIDOCAINE HYDROCHLORIDE 20 MG/ML
JELLY TOPICAL
Status: DISCONTINUED | OUTPATIENT
Start: 2023-05-16 | End: 2023-05-16

## 2023-05-16 RX ORDER — LIDOCAINE HYDROCHLORIDE 10 MG/ML
1 INJECTION, SOLUTION EPIDURAL; INFILTRATION; INTRACAUDAL; PERINEURAL ONCE
Status: DISCONTINUED | OUTPATIENT
Start: 2023-05-16 | End: 2023-05-16 | Stop reason: HOSPADM

## 2023-05-16 RX ORDER — OXYCODONE HYDROCHLORIDE 5 MG/1
5 TABLET ORAL ONCE AS NEEDED
Status: COMPLETED | OUTPATIENT
Start: 2023-05-16 | End: 2023-05-16

## 2023-05-16 RX ORDER — HYDRALAZINE HYDROCHLORIDE 20 MG/ML
5 INJECTION INTRAMUSCULAR; INTRAVENOUS ONCE
Status: COMPLETED | OUTPATIENT
Start: 2023-05-16 | End: 2023-05-16

## 2023-05-16 RX ORDER — ONDANSETRON HYDROCHLORIDE 2 MG/ML
INJECTION, SOLUTION INTRAMUSCULAR; INTRAVENOUS
Status: DISCONTINUED | OUTPATIENT
Start: 2023-05-16 | End: 2023-05-16

## 2023-05-16 RX ORDER — OXYCODONE HYDROCHLORIDE 5 MG/1
5 TABLET ORAL EVERY 4 HOURS PRN
Status: CANCELLED | OUTPATIENT
Start: 2023-05-16

## 2023-05-16 RX ORDER — LOSARTAN POTASSIUM 100 MG/1
100 TABLET ORAL ONCE
Status: COMPLETED | OUTPATIENT
Start: 2023-05-16 | End: 2023-05-16

## 2023-05-16 RX ORDER — HYDRALAZINE HYDROCHLORIDE 20 MG/ML
10 INJECTION INTRAMUSCULAR; INTRAVENOUS ONCE
Status: DISCONTINUED | OUTPATIENT
Start: 2023-05-16 | End: 2023-05-16 | Stop reason: HOSPADM

## 2023-05-16 RX ORDER — LIDOCAINE HYDROCHLORIDE 10 MG/ML
INJECTION, SOLUTION EPIDURAL; INFILTRATION; INTRACAUDAL; PERINEURAL
Status: DISCONTINUED | OUTPATIENT
Start: 2023-05-16 | End: 2023-05-16 | Stop reason: HOSPADM

## 2023-05-16 RX ORDER — BUPIVACAINE HYDROCHLORIDE 5 MG/ML
INJECTION, SOLUTION EPIDURAL; INTRACAUDAL
Status: DISCONTINUED | OUTPATIENT
Start: 2023-05-16 | End: 2023-05-16 | Stop reason: HOSPADM

## 2023-05-16 RX ORDER — CEFAZOLIN SODIUM 2 G/50ML
2 SOLUTION INTRAVENOUS
Status: COMPLETED | OUTPATIENT
Start: 2023-05-16 | End: 2023-05-16

## 2023-05-16 RX ORDER — PROPOFOL 10 MG/ML
VIAL (ML) INTRAVENOUS
Status: DISCONTINUED | OUTPATIENT
Start: 2023-05-16 | End: 2023-05-16

## 2023-05-16 RX ORDER — LIDOCAINE HYDROCHLORIDE 20 MG/ML
INJECTION INTRAVENOUS
Status: DISCONTINUED | OUTPATIENT
Start: 2023-05-16 | End: 2023-05-16

## 2023-05-16 RX ORDER — FENTANYL CITRATE 50 UG/ML
INJECTION, SOLUTION INTRAMUSCULAR; INTRAVENOUS
Status: DISCONTINUED | OUTPATIENT
Start: 2023-05-16 | End: 2023-05-16

## 2023-05-16 RX ADMIN — DEXAMETHASONE SODIUM PHOSPHATE 8 MG: 4 INJECTION, SOLUTION INTRA-ARTICULAR; INTRALESIONAL; INTRAMUSCULAR; INTRAVENOUS; SOFT TISSUE at 11:05

## 2023-05-16 RX ADMIN — ACETAMINOPHEN 1000 MG: 10 INJECTION, SOLUTION INTRAVENOUS at 11:05

## 2023-05-16 RX ADMIN — CEFAZOLIN SODIUM 2 G: 2 SOLUTION INTRAVENOUS at 11:05

## 2023-05-16 RX ADMIN — HYDRALAZINE HYDROCHLORIDE 5 MG: 20 INJECTION INTRAMUSCULAR; INTRAVENOUS at 12:05

## 2023-05-16 RX ADMIN — KETAMINE HYDROCHLORIDE 50 MG: 100 INJECTION, SOLUTION, CONCENTRATE INTRAMUSCULAR; INTRAVENOUS at 11:05

## 2023-05-16 RX ADMIN — SODIUM CHLORIDE, SODIUM GLUCONATE, SODIUM ACETATE, POTASSIUM CHLORIDE AND MAGNESIUM CHLORIDE: 526; 502; 368; 37; 30 INJECTION, SOLUTION INTRAVENOUS at 11:05

## 2023-05-16 RX ADMIN — OXYCODONE HYDROCHLORIDE 5 MG: 5 TABLET ORAL at 12:05

## 2023-05-16 RX ADMIN — LIDOCAINE HYDROCHLORIDE 2 ML: 20 JELLY TOPICAL at 11:05

## 2023-05-16 RX ADMIN — LOSARTAN POTASSIUM 100 MG: 100 TABLET, FILM COATED ORAL at 07:05

## 2023-05-16 RX ADMIN — MIDAZOLAM HYDROCHLORIDE 2 MG: 1 INJECTION, SOLUTION INTRAMUSCULAR; INTRAVENOUS at 11:05

## 2023-05-16 RX ADMIN — LIDOCAINE HYDROCHLORIDE 100 MG: 20 INJECTION, SOLUTION INTRAVENOUS at 11:05

## 2023-05-16 RX ADMIN — PROPOFOL 180 MG: 10 INJECTION, EMULSION INTRAVENOUS at 11:05

## 2023-05-16 RX ADMIN — FENTANYL CITRATE 100 MCG: 50 INJECTION, SOLUTION INTRAMUSCULAR; INTRAVENOUS at 11:05

## 2023-05-16 RX ADMIN — SODIUM CHLORIDE, SODIUM GLUCONATE, SODIUM ACETATE, POTASSIUM CHLORIDE AND MAGNESIUM CHLORIDE: 526; 502; 368; 37; 30 INJECTION, SOLUTION INTRAVENOUS at 06:05

## 2023-05-16 RX ADMIN — HYDROCHLOROTHIAZIDE 25 MG: 25 TABLET ORAL at 07:05

## 2023-05-16 RX ADMIN — ONDANSETRON 4 MG: 2 INJECTION INTRAMUSCULAR; INTRAVENOUS at 11:05

## 2023-05-16 NOTE — ANESTHESIA PROCEDURE NOTES
Intubation    Date/Time: 5/16/2023 11:08 AM  Performed by: Angel Valentine Jr., CRNA  Authorized by: Martínez Resendiz MD     Intubation:     Induction:  Intravenous    Intubated:  Postinduction    Mask Ventilation:  Easy mask    Attempts:  1    Attempted By:  CRNA    Difficult Airway Encountered?: No      Complications:  None    Airway Device:  Supraglottic airway/LMA    Airway Device Size:  4.0    Style/Cuff Inflation:  Cuffed (inflated to minimal occlusive pressure)    Inflation Amount (mL):  4    Secured at:  The lips    Placement Verified By:  Capnometry    Complicating Factors:  Obesity    Findings Post-Intubation:  BS equal bilateral and atraumatic/condition of teeth unchanged

## 2023-05-16 NOTE — BRIEF OP NOTE
[facility]  Brief Operative Note    Surgery Date: 5/16/2023     Surgeon(s) and Role:     * Chas Mccarty MD - Primary    Assisting Surgeon: vlad    Pre-op Diagnosis:  Acute lateral meniscus tear of left knee, sequela [S83.282S]    Post-op Diagnosis:  Post-Op Diagnosis Codes:     * Acute lateral meniscus tear of left knee, sequela [S83.282S]  Osteoarthritis left knee  Procedure(s) (LRB):  ARTHROSCOPY, KNEE, WITH MENISCECTOMY, LEFT (Left)    Anesthesia: General    Operative Findings:  Complex lateral meniscus tear, osteoarthritis lateral compartment    Estimated Blood Loss: 5 mL         Specimens:   Specimen (24h ago, onward)      None              Discharge Note    OUTCOME: Patient tolerated treatment/procedure well without complication and is now ready for discharge.    DISPOSITION: Home or Self Care    FINAL DIAGNOSIS:  <principal problem not specified>    FOLLOWUP: In clinic    DISCHARGE INSTRUCTIONS:    Discharge Procedure Orders   CRUTCHES FOR HOME USE     Order Specific Question Answer Comments   Type: Axillary    Height: 5'11    Weight: 286 lb    Length of need (1-99 months): 1      Diet general     Activity as tolerated     Ice to affected area     Weight bearing restrictions (specify)     No driving, operating heavy equipment or signing legal documents while taking pain medication     Remove dressing in 48 hours     Call MD for:  temperature >100.4     Call MD for:  persistent nausea and vomiting     Call MD for:  severe uncontrolled pain     Call MD for:  difficulty breathing, headache or visual disturbances     Call MD for:  redness, tenderness, or signs of infection (pain, swelling, redness, odor or green/yellow discharge around incision site)     Call MD for:  hives     Call MD for:  persistent dizziness or light-headedness     Call MD for:  extreme fatigue     Shower on day dressing removed (No bath)

## 2023-05-16 NOTE — PLAN OF CARE
Bp cuff kept popping off, very elevated readings.  Tried repositioning several times.  Dr. Resendiz notified - orders rec'd for hydralazine.

## 2023-05-16 NOTE — TRANSFER OF CARE
Anesthesia Transfer of Care Note    Patient: Jarrod Lopez    Procedure(s) Performed: Procedure(s) (LRB):  ARTHROSCOPY, KNEE, WITH MENISCECTOMY, LEFT (Left)    Patient location: PACU    Anesthesia Type: general    Transport from OR: Transported from OR on 2-3 L/min O2 by NC with adequate spontaneous ventilation    Post pain: adequate analgesia    Post assessment: no apparent anesthetic complications and tolerated procedure well    Post vital signs: stable    Level of consciousness: awake, alert and oriented    Nausea/Vomiting: no nausea/vomiting    Complications: none    Transfer of care protocol was followed      Last vitals:   Visit Vitals  BP (!) 193/100   Pulse 78   Temp 36.9 °C (98.4 °F) (Oral)   Resp 18   SpO2 100%

## 2023-05-16 NOTE — PLAN OF CARE
Surgery desk notified, anesthesia not comfortable with proceeding until diastolic under 100. Md aware

## 2023-05-16 NOTE — ANESTHESIA PREPROCEDURE EVALUATION
05/16/2023  Jarrod Lopez is a 42 y.o., male.      Pre-op Assessment    I have reviewed the Patient Summary Reports.     I have reviewed the Nursing Notes. I have reviewed the NPO Status.   I have reviewed the Medications.     Review of Systems  Anesthesia Hx:  No problems with previous Anesthesia    Social:  Former Smoker    Cardiovascular:   Hypertension, well controlled    Pulmonary:  Pulmonary Normal    Renal/:  Renal/ Normal     Musculoskeletal:  Spine Disorders: lumbar    Neurological:  Neurology Normal    Endocrine:  Endocrine Normal  Obesity / BMI > 30, Morbid Obesity / BMI > 40      Physical Exam  General: Well nourished, Cooperative, Alert and Oriented    Airway:  Mallampati: II   Mouth Opening: Normal  TM Distance: Normal  Neck ROM: Normal ROM        Anesthesia Plan  Type of Anesthesia, risks & benefits discussed:    Anesthesia Type: Gen ETT, Gen Supraglottic Airway, Gen Natural Airway, MAC  Intra-op Monitoring Plan: Standard ASA Monitors  Post Op Pain Control Plan: multimodal analgesia  Induction:  IV  Airway Plan: Direct, Video and Fiberoptic, Post-Induction  Informed Consent: Informed consent signed with the Patient and all parties understand the risks and agree with anesthesia plan.  All questions answered.   ASA Score: 3    Ready For Surgery From Anesthesia Perspective.     .

## 2023-05-16 NOTE — PLAN OF CARE
Pt awake, alert, oriented. Vital signs stable. Crutches provided per order. Discharge instructions reviewed with pt and pt's friend. Both verbalized understanding. IV removed, catheter intact. Dressing to L knee clean, dry, and intact. All belongings returned to patient. Pt transferred to car via wheelchair per lindy Rubio. Safety maintained.

## 2023-05-16 NOTE — PLAN OF CARE
New order received, meds given po with small sip of water. Lights dimm, calm music playing. Will recheck

## 2023-05-16 NOTE — DISCHARGE INSTRUCTIONS
"Discharge Instructions: After Your Surgery/Procedure  Youve just had surgery. During surgery you were given medicine called anesthesia to keep you relaxed and free of pain. After surgery you may have some pain or nausea. This is common. Here are some tips for feeling better and getting well after surgery.     Stay on schedule with your medication.   Going home  Your doctor or nurse will show you how to take care of yourself when you go home. He or she will also answer your questions. Have an adult family member or friend drive you home.      For your safety we recommend these precaution for the first 24 hours after your procedure:  Do not drive or use heavy equipment.  Do not make important decisions or sign legal papers.  Do not drink alcohol.  Have someone stay with you, if needed. He or she can watch for problems and help keep you safe.  Your concentration, balance, coordination, and judgement may be impaired for many hours after anesthesia.  Use caution when ambulating or standing up.     You may feel weak and "washed out" after anesthesia and surgery.      Subtle residual effects of general anesthesia or sedation with regional / local anesthesia can last more than 24 hours.  Rest for the remainder of the day or longer if your Doctor/Surgeon has advised you to do so.  Although you may feel normal within the first 24 hours, your reflexes and mental ability may be impaired without you realizing it.  You may feel dizzy, lightheaded or sleepy for 24 hours or longer.      Be sure to go to all follow-up visits with your doctor. And rest after your surgery for as long as your doctor tells you to.  Coping with pain  If you have pain after surgery, pain medicine will help you feel better. Take it as told, before pain becomes severe. Also, ask your doctor or pharmacist about other ways to control pain. This might be with heat, ice, or relaxation. And follow any other instructions your surgeon or nurse gives you.  Tips " for taking pain medicine  To get the best relief possible, remember these points:  Pain medicines can upset your stomach. Taking them with a little food may help.  Most pain relievers taken by mouth need at least 20 to 30 minutes to start to work.  Taking medicine on a schedule can help you remember to take it. Try to time your medicine so that you can take it before starting an activity. This might be before you get dressed, go for a walk, or sit down for dinner.  Constipation is a common side effect of pain medicines. Call your doctor before taking any medicines such as laxatives or stool softeners to help ease constipation. Also ask if you should skip any foods. Drinking lots of fluids and eating foods such as fruits and vegetables that are high in fiber can also help. Remember, do not take laxatives unless your surgeon has prescribed them.  Drinking alcohol and taking pain medicine can cause dizziness and slow your breathing. It can even be deadly. Do not drink alcohol while taking pain medicine.  Pain medicine can make you react more slowly to things. Do not drive or run machinery while taking pain medicine.  Your health care provider may tell you to take acetaminophen to help ease your pain. Ask him or her how much you are supposed to take each day. Acetaminophen or other pain relievers may interact with your prescription medicines or other over-the-counter (OTC) drugs. Some prescription medicines have acetaminophen and other ingredients. Using both prescription and OTC acetaminophen for pain can cause you to overdose. Read the labels on your OTC medicines with care. This will help you to clearly know the list of ingredients, how much to take, and any warnings. It may also help you not take too much acetaminophen. If you have questions or do not understand the information, ask your pharmacist or health care provider to explain it to you before you take the OTC medicine.  Managing nausea  Some people have an  upset stomach after surgery. This is often because of anesthesia, pain, or pain medicine, or the stress of surgery. These tips will help you handle nausea and eat healthy foods as you get better. If you were on a special food plan before surgery, ask your doctor if you should follow it while you get better. These tips may help:  Do not push yourself to eat. Your body will tell you when to eat and how much.  Start off with clear liquids and soup. They are easier to digest.  Next try semi-solid foods, such as mashed potatoes, applesauce, and gelatin, as you feel ready.  Slowly move to solid foods. Dont eat fatty, rich, or spicy foods at first.  Do not force yourself to have 3 large meals a day. Instead eat smaller amounts more often.  Take pain medicines with a small amount of solid food, such as crackers or toast, to avoid nausea.     Call your surgeon if  You still have pain an hour after taking medicine. The medicine may not be strong enough.  You feel too sleepy, dizzy, or groggy. The medicine may be too strong.  You have side effects like nausea, vomiting, or skin changes, such as rash, itching, or hives.       If you have obstructive sleep apnea  You were given anesthesia medicine during surgery to keep you comfortable and free of pain. After surgery, you may have more apnea spells because of this medicine and other medicines you were given. The spells may last longer than usual.   At home:  Keep using the continuous positive airway pressure (CPAP) device when you sleep. Unless your health care provider tells you not to, use it when you sleep, day or night. CPAP is a common device used to treat obstructive sleep apnea.  Talk with your provider before taking any pain medicine, muscle relaxants, or sedatives. Your provider will tell you about the possible dangers of taking these medicines.  © 1181-2446 [employer]. [address] [place] [address], [place] [address]. All rights reserved. This information is  not intended as a substitute for professional medical care. Always follow your healthcare professional's instructions.           Using an Incentive Spirometer    An incentive spirometer is a device that helps you do deep breathing exercises. These exercises expand your lungs, aid in circulation, and help prevent pneumonia. Deep breathing exercises also help you breathe better and improve the function of your lungs by:  Keeping your lungs clear  Strengthening your breathing muscles  Helping prevent respiratory complications or problems  The incentive spirometer gives you a way to take an active part in recover. A nurse or therapist will teach you breathing exercises. To do these exercises, you will breathe in through your mouth and not your nose. The incentive spirometer only works correctly if you breathe in through your mouth.  Steps to clear lungs  Step 1. Exhale normally. Then, inhale normally.  Relax and breathe out.  Step 2. Place your lips tightly around the mouthpiece.  Make sure the device is upright and not tilted.  Step 3. Inhale as much air as you can through the mouthpiece (don't breath through your nose).  Inhale slowly and deeply.  Hold your breath long enough to keep the balls or disk raised for at least 3 to 5 seconds, or as instructed by your healthcare provider.  Some spirometers have an indicator to let you know that you are breathing in too fast. If the indicator goes off, breathe in more slowly.  Step 4. Repeat the exercise regularly.  Do this exercise every hour while you're awake, or as instructed by your healthcare provider.  If you were taught deep breathing and coughing exercises, do them regularly as instructed by your healthcare provider.            Post op instructions for prevention of DVT  What is deep vein thrombosis?  Deep vein thrombosis (DVT) is the medical term for blood clots in the deep veins of the leg.  These blood clots can be dangerous.  A DVT can block a blood vessel and  keep blood from getting where it needs to go.  Another problem is that the clot can travel to other parts of the body such as the lungs.  A clot that travels to the lungs is called a pulmonary embolus (PE) and can cause serious problems with breathing which can lead to death.  Am I at risk for DVT/PE?  If you are not very active, you are at risk of DVT.  Anyone confined to bed, sitting for long periods of time, recovering from surgery, etc. increases the risk of DVT.  Other risk factors are cancer diagnosis, certain medications, estrogen replacement in any form,older age, obesity, pregnancy, smoking, history of clotting disorders, and dehydration.  How will I know if I have a DVT?  Swelling in the lower leg  Pain  Warmth, redness, hardness or bulging of the vein  If you have any of these symptoms, call your doctors office right away.  Some people will not have any symptoms until the clot moves to the lungs.  What are the symptoms of a PE?  Panting, shortness of breath, or trouble breathing  Sharp, knife-like chest pain when you breathe  Coughing or coughing up blood  Rapid heartbeat  If you have any of these symptoms or get worse quickly, call 911 for emergency treatment.  How can I prevent a DVT?  Avoid long periods of inactivity and dont cross your legs--get up and walk around every hour or so.  Stay active--walking after surgery is highly encouraged.  This means you should get out of the house and walk in the neighborhood.  Going up and down stairs will not impair healing (unless advised against such activity by your doctor).    Drink plenty of noncaffeinated, nonalcoholic fluids each day to prevent dehydration.  Wear special support stockings, if they have been advised by your doctor.  If you travel, stop at least once an hour and walk around.  Avoid smoking (assistance with stopping is available through your healthcare provider)  Always notify your doctor if you are not able to follow the post operative  instructions that are given to you at the time of discharge.  It may be necessary to prescribe one of the medications available to prevent DVT.           We hope your stay was comfortable as you heal now, mend and rest.    For we have enjoyed taking care of you by giving your our best.    And as you get better, by regaining your health and strength;   We count it as a privilege to have served you and hope your time at [facility] was well spent.      Thank  You!!!

## 2023-05-16 NOTE — OP NOTE
[facility]  Orthopedic  Operative Note    SUMMARY     Date of Procedure: 5/16/2023     Procedure: Procedure(s) (LRB):  ARTHROSCOPY, KNEE, WITH MENISCECTOMY, LEFT (Left)       Surgeon(s) and Role:     * Chas Mccarty MD - Primary    Assisting Surgeon: july chu    Pre-Operative Diagnosis: Acute lateral meniscus tear of left knee, sequela [S83.282S]    Post-Operative Diagnosis: Post-Op Diagnosis Codes:     * Acute lateral meniscus tear of left knee, sequela [S83.282S]  Osteoarthritis lateral compartment left knee  Anesthesia: General    Procedure in General:  The patient was brought to the operating room while supine was intubated.  A tourniquet was placed on the mid left thigh and leg was placed in the arthroscopic leg larson.  The leg with alcohol prepped with ChloraPrep solution and draped in the usual fashion.  A time-out was performed.  This is to verify that in my opinion a qualified experienced surgical assistant was necessary for the surgical procedure.  The individuals responsibilities included assistance with patient positioning, surgical exposure, and wound closure.  The inclusion of a surgical assistant for the procedure also reduced the operative time and anesthesia time.july chu served as surgical assistant.  After time-out was performed intravenous antibiotics were given the leg was exsanguinated with an Esmarch tourniquet and pneumatic tourniquet inflated to 300 mmHg.  A midpatellar portal was used introduce the knee the arthroscope into the knee joint and we noted a marked effusion of the left knee which was evacuated.  We then inserted the camera into the knee joint entering the lateral compartment where we noted a complex tear of the entirety of the meniscus and significant osteoarthritis of lateral compartment.  An accessory lateral portal was made and the shaver inserted as well as the arthroscopic scissors to remove the torn portion of the lateral meniscus.  The  ACL ligament was attenuated.  Grade 2 chondromalacia changes were noted in the medial compartment but the medial meniscus was intact.  No loose bodies were observed.  Again we checked the remainder of the meniscus but were surprised about the significant articular cartilage loss on the tibial and femoral sides in the lateral compartment.  We thoroughly lavaged the joint closed the portals.  We injected an anesthetic around the portals pain control and then applied a compressive dressing.  The tourniquet was deflated and the patient extubated.            Complications: None    Estimated Blood Loss (EBL):            Implants: * No implants in log *    Specimens:   Specimen (24h ago, onward)      None                    Condition: Good    Disposition: PACU - hemodynamically stable.    Attestation: I was present and scrubbed for the entire procedure.

## 2023-05-16 NOTE — ANESTHESIA POSTPROCEDURE EVALUATION
Anesthesia Post Evaluation    Patient: Jarrod John    Procedure(s) Performed: Procedure(s) (LRB):  ARTHROSCOPY, KNEE, WITH MENISCECTOMY, LEFT (Left)    Final Anesthesia Type: general      Patient location during evaluation: PACU  Patient participation: Yes- Able to Participate  Level of consciousness: awake and alert  Post-procedure vital signs: reviewed and stable  Pain management: adequate  Airway patency: patent    PONV status at discharge: No PONV  Anesthetic complications: no      Cardiovascular status: blood pressure returned to baseline  Respiratory status: unassisted  Hydration status: euvolemic  Follow-up not needed.          Vitals Value Taken Time   /88 05/16/23 1255   Temp   05/16/23 1545   Pulse 63 05/16/23 1255   Resp 16 05/16/23 1255   SpO2 100 % 05/16/23 1255         Event Time   Out of Recovery 12:55:00         Pain/Jose Score: Pain Rating Prior to Med Admin: 3 (5/16/2023 12:36 PM)  Jose Score: 10 (5/16/2023 12:45 PM)  Modified Jose Score: 20 (5/16/2023 12:55 PM)

## 2023-05-17 VITALS
HEART RATE: 59 BPM | SYSTOLIC BLOOD PRESSURE: 148 MMHG | RESPIRATION RATE: 15 BRPM | DIASTOLIC BLOOD PRESSURE: 89 MMHG | TEMPERATURE: 98 F | OXYGEN SATURATION: 100 %

## 2023-05-17 NOTE — PROGRESS NOTES
"Very pleased with care given.  All staff were supportive.  States Percocet makes him "woozy and nauseated" and will use ibuprofen and Ice for pain.  WBAT with crutches.  States he understands discharge instructions.  "

## 2023-05-18 ENCOUNTER — TELEPHONE (OUTPATIENT)
Dept: ORTHOPEDICS | Facility: CLINIC | Age: 43
End: 2023-05-18

## 2023-05-18 NOTE — TELEPHONE ENCOUNTER
Spoke with patient. Patient stated he is a  and his job does require him to stand prolonged periods of time. Patient advised we can not release him to work at this time until he is seen post-operatively on 5/26/2023. Patient understood and requested letter be emailed to him at [email or web address]

## 2023-05-18 NOTE — TELEPHONE ENCOUNTER
----- Message from Maggy Patton sent at 5/17/2023  8:54 AM CDT -----  Pt  had  surgery  with  Dr. Mccarty  on  yesterday but  wants  to  know  when he  can  return  to work. Pt  wants  a  letter  for  work  on  when he  can  return  and  how  long  he'll be  out  for. Pt  wants a call  back  to  discuss.

## 2023-05-18 NOTE — LETTER
May 18, 2023      [facility]  [address]  [place] [address]  Phone: [phone]  Fax: [phone]       Patient: Jarrod Lopez   YOB: 1980  Date of Visit: 05/18/2023    To Whom It May Concern:    Jarrod Lopez, is unable to work at this time due to recent left knee surgery. His work status will be re-addressed at his next visit, 5/26/2023.    Sincerely,    Chas Mccarty MD

## 2023-05-25 DIAGNOSIS — S83.282S ACUTE LATERAL MENISCUS TEAR OF LEFT KNEE, SEQUELA: Primary | ICD-10-CM

## 2023-05-25 RX ORDER — HYDROCODONE BITARTRATE AND ACETAMINOPHEN 7.5; 325 MG/1; MG/1
1 TABLET ORAL EVERY 6 HOURS PRN
Qty: 28 TABLET | Refills: 0 | Status: SHIPPED | OUTPATIENT
Start: 2023-05-25 | End: 2023-06-01

## 2023-05-25 NOTE — TELEPHONE ENCOUNTER
----- Message from Fay Meehan sent at 5/25/2023 11:34 AM CDT -----  Phone #: [phone]  Patient is requesting a another pain script-Percocet makes him sick        Pharmacy:   [facility] [address] [address]  [address] [facility] [place]  [place] [address]  Phone: [phone] Fax: [phone]

## 2023-05-26 ENCOUNTER — OFFICE VISIT (OUTPATIENT)
Dept: ORTHOPEDICS | Facility: CLINIC | Age: 43
End: 2023-05-26
Payer: COMMERCIAL

## 2023-05-26 VITALS — BODY MASS INDEX: 40.04 KG/M2 | HEIGHT: 71 IN | WEIGHT: 286 LBS

## 2023-05-26 DIAGNOSIS — M17.12 PRIMARY OSTEOARTHRITIS OF LEFT KNEE: ICD-10-CM

## 2023-05-26 DIAGNOSIS — Z98.890 STATUS POST ARTHROSCOPY OF LEFT KNEE: Primary | ICD-10-CM

## 2023-05-26 PROCEDURE — 4010F PR ACE/ARB THEARPY RXD/TAKEN: ICD-10-PCS | Mod: CPTII,S$GLB,, | Performed by: PHYSICIAN ASSISTANT

## 2023-05-26 PROCEDURE — 3008F PR BODY MASS INDEX (BMI) DOCUMENTED: ICD-10-PCS | Mod: CPTII,S$GLB,, | Performed by: PHYSICIAN ASSISTANT

## 2023-05-26 PROCEDURE — 99024 POSTOP FOLLOW-UP VISIT: CPT | Mod: S$GLB,POP,, | Performed by: PHYSICIAN ASSISTANT

## 2023-05-26 PROCEDURE — 4010F ACE/ARB THERAPY RXD/TAKEN: CPT | Mod: CPTII,S$GLB,, | Performed by: PHYSICIAN ASSISTANT

## 2023-05-26 PROCEDURE — 3008F BODY MASS INDEX DOCD: CPT | Mod: CPTII,S$GLB,, | Performed by: PHYSICIAN ASSISTANT

## 2023-05-26 PROCEDURE — 3044F HG A1C LEVEL LT 7.0%: CPT | Mod: CPTII,S$GLB,, | Performed by: PHYSICIAN ASSISTANT

## 2023-05-26 PROCEDURE — 3044F PR MOST RECENT HEMOGLOBIN A1C LEVEL <7.0%: ICD-10-PCS | Mod: CPTII,S$GLB,, | Performed by: PHYSICIAN ASSISTANT

## 2023-05-26 PROCEDURE — 99024 PR POST-OP FOLLOW-UP VISIT: ICD-10-PCS | Mod: S$GLB,POP,, | Performed by: PHYSICIAN ASSISTANT

## 2023-05-26 PROCEDURE — 1159F PR MEDICATION LIST DOCUMENTED IN MEDICAL RECORD: ICD-10-PCS | Mod: CPTII,S$GLB,, | Performed by: PHYSICIAN ASSISTANT

## 2023-05-26 PROCEDURE — 1159F MED LIST DOCD IN RCRD: CPT | Mod: CPTII,S$GLB,, | Performed by: PHYSICIAN ASSISTANT

## 2023-05-26 NOTE — LETTER
May 26, 2023      [facility]  [address]  [place] [address]  Phone: [phone]  Fax: [phone]       Patient: Jarrod Lopez   YOB: 1980  Date of Visit: 05/26/2023    To Whom It May Concern:    Chandler Lopez  was at [facility] on 05/26/2023. The patient may return to work in two weeks. If you have any questions or concerns, or if I can be of further assistance, please do not hesitate to contact me.    Sincerely,    GENIE Jade

## 2023-05-26 NOTE — PROGRESS NOTES
[facility] ORTHOPEDICS  [address]  [place] [address]  Phone: [phone]   [phone]    Patient's PCP:EVON Bryant  Referring Provider: No ref. provider found    POST-OP Note:    Subjective:        Chief Complaint:   Chief Complaint   Patient presents with    Left Knee - Post-op Evaluation     Left knee scope 05/16/23, patient is doing ok, he is here to get his sutures removed.       Past Medical History:   Diagnosis Date    Hypertension        Past Surgical History:   Procedure Laterality Date    CIRCUMCISION      15 years old    KNEE ARTHROSCOPY W/ MENISCECTOMY Left 5/16/2023    Procedure: ARTHROSCOPY, KNEE, WITH MENISCECTOMY, LEFT;  Surgeon: Chas Mccarty MD;  Location: formerly [facility];  Service: Orthopedics;  Laterality: Left;       Current Outpatient Medications   Medication Sig    hydroCHLOROthiazide (HYDRODIURIL) 25 MG tablet Take 1 tablet (25 mg total) by mouth once daily.    HYDROcodone-acetaminophen (NORCO) 7.5-325 mg per tablet Take 1 tablet by mouth every 6 (six) hours as needed for Pain.    losartan (COZAAR) 100 MG tablet Take 1 tablet (100 mg total) by mouth once daily.     No current facility-administered medications for this visit.       Review of patient's allergies indicates:  No Known Allergies    Family History   Problem Relation Age of Onset    Hypertension Mother        Social History     Socioeconomic History    Marital status: Single    Number of children: 5   Tobacco Use    Smoking status: Former     Types: Cigarettes    Smokeless tobacco: Never   Substance and Sexual Activity    Alcohol use: No    Drug use: Yes     Types: Marijuana    Sexual activity: Not Currently     Partners: Female       History of present illness:  43-year-old male, returns to clinic today status post arthroscopy of the knee done on May 16th.  He is here today for wound Check and suture removal.  Significant findings included lateral meniscal tear, grade 3/4 chondromalacia of the  lateral compartment and grade 2 chondromalacia of the medial compartment.    Date of Procedure: 5/16/2023      Procedure: Procedure(s) (LRB):  ARTHROSCOPY, KNEE, WITH MENISCECTOMY, LEFT (Left)        Surgeon(s) and Role:     * Chas Mccarty MD - Primary     Assisting Surgeon: july chu     Pre-Operative Diagnosis: Acute lateral meniscus tear of left knee, sequela [S83.282S]     Post-Operative Diagnosis: Post-Op Diagnosis Codes:     * Acute lateral meniscus tear of left knee, sequela [S83.282S]  Osteoarthritis lateral compartment left knee  Anesthesia: General     Procedure in General:  The patient was brought to the operating room while supine was intubated.  A tourniquet was placed on the mid left thigh and leg was placed in the arthroscopic leg larson.  The leg with alcohol prepped with ChloraPrep solution and draped in the usual fashion.  A time-out was performed.  This is to verify that in my opinion a qualified experienced surgical assistant was necessary for the surgical procedure.  The individuals responsibilities included assistance with patient positioning, surgical exposure, and wound closure.  The inclusion of a surgical assistant for the procedure also reduced the operative time and anesthesia time.july chu served as surgical assistant.  After time-out was performed intravenous antibiotics were given the leg was exsanguinated with an Esmarch tourniquet and pneumatic tourniquet inflated to 300 mmHg.  A midpatellar portal was used introduce the knee the arthroscope into the knee joint and we noted a marked effusion of the left knee which was evacuated.  We then inserted the camera into the knee joint entering the lateral compartment where we noted a complex tear of the entirety of the meniscus and significant osteoarthritis of lateral compartment.  An accessory lateral portal was made and the shaver inserted as well as the arthroscopic scissors to remove the torn portion of the lateral  meniscus.  The ACL ligament was attenuated.  Grade 2 chondromalacia changes were noted in the medial compartment but the medial meniscus was intact.  No loose bodies were observed.  Again we checked the remainder of the meniscus but were surprised about the significant articular cartilage loss on the tibial and femoral sides in the lateral compartment.  We thoroughly lavaged the joint closed the portals.  We injected an anesthetic around the portals pain control and then applied a compressive dressing.  The tourniquet was deflated and the patient extubated.      Review of Systems:    Musculoskeletal:  See HPI       Objective:        Physical Examination:    Vital Signs: There were no vitals filed for this visit.    Body mass index is 39.89 kg/m².    This a well-developed, well nourished patient in no acute distress.  They are alert and oriented and cooperative to examination.        Examination of the left knee, standard arthroscopic portals medial and laterally were noted.  Sutures were intact.  No effusion.  Skin is dry and intact, no erythema or ecchymosis no signs symptoms of infection no evidence of wound failure dehiscence.  No effusion.  Range of motion 0-110 degrees.  Stable anterior-posterior varus and valgus stress.  Calf soft nontender.    Pertinent New Results:     XRAY Report / Interpretation:        Assessment:       1. Status post arthroscopy of left knee    2. Primary osteoarthritis of left knee      Plan:     Status post arthroscopy of left knee    Primary osteoarthritis of left knee        Follow up in about 4 weeks (around 6/23/2023) for PO left knee scope 05/16/23, Mon/Wed.    Stable status post arthroscopy of the left knee for partial lateral meniscectomy.  Patient found have advanced osteoarthritis in the lateral compartment and moderate osteoarthrosis in the medial compartment.  He can resume his normal activities as tolerated.  We discussed wound care postoperatively gentle scrubbing mild  soap and water pat dry.  Follow-up in 1 month.    We did give him a letter today stating that he can return to work in 2 weeks.  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  This may continue to aggravate his symptoms.  We would expect this however to improve.      VEL Issa, PA-C    This note was created using [employer] voice recognition software that occasionally misinterprets words or phrases.

## 2023-05-30 ENCOUNTER — PATIENT MESSAGE (OUTPATIENT)
Dept: ORTHOPEDICS | Facility: CLINIC | Age: 43
End: 2023-05-30

## 2023-06-04 ENCOUNTER — PATIENT MESSAGE (OUTPATIENT)
Dept: FAMILY MEDICINE | Facility: CLINIC | Age: 43
End: 2023-06-04

## 2023-06-26 ENCOUNTER — OFFICE VISIT (OUTPATIENT)
Dept: ORTHOPEDICS | Facility: CLINIC | Age: 43
End: 2023-06-26
Payer: COMMERCIAL

## 2023-06-26 VITALS
HEIGHT: 71 IN | DIASTOLIC BLOOD PRESSURE: 82 MMHG | BODY MASS INDEX: 40.04 KG/M2 | SYSTOLIC BLOOD PRESSURE: 130 MMHG | WEIGHT: 286 LBS

## 2023-06-26 DIAGNOSIS — M17.12 PRIMARY OSTEOARTHRITIS OF LEFT KNEE: ICD-10-CM

## 2023-06-26 DIAGNOSIS — Z98.890 STATUS POST ARTHROSCOPY OF LEFT KNEE: Primary | ICD-10-CM

## 2023-06-26 DIAGNOSIS — M17.11 PRIMARY OSTEOARTHRITIS OF RIGHT KNEE: ICD-10-CM

## 2023-06-26 PROCEDURE — 99024 POSTOP FOLLOW-UP VISIT: CPT | Mod: S$GLB,,, | Performed by: ORTHOPAEDIC SURGERY

## 2023-06-26 PROCEDURE — 4010F ACE/ARB THERAPY RXD/TAKEN: CPT | Mod: CPTII,S$GLB,, | Performed by: ORTHOPAEDIC SURGERY

## 2023-06-26 PROCEDURE — 99024 PR POST-OP FOLLOW-UP VISIT: ICD-10-PCS | Mod: S$GLB,,, | Performed by: ORTHOPAEDIC SURGERY

## 2023-06-26 PROCEDURE — 3044F HG A1C LEVEL LT 7.0%: CPT | Mod: CPTII,S$GLB,, | Performed by: ORTHOPAEDIC SURGERY

## 2023-06-26 PROCEDURE — 3075F PR MOST RECENT SYSTOLIC BLOOD PRESS GE 130-139MM HG: ICD-10-PCS | Mod: CPTII,S$GLB,, | Performed by: ORTHOPAEDIC SURGERY

## 2023-06-26 PROCEDURE — 3079F DIAST BP 80-89 MM HG: CPT | Mod: CPTII,S$GLB,, | Performed by: ORTHOPAEDIC SURGERY

## 2023-06-26 PROCEDURE — 3079F PR MOST RECENT DIASTOLIC BLOOD PRESSURE 80-89 MM HG: ICD-10-PCS | Mod: CPTII,S$GLB,, | Performed by: ORTHOPAEDIC SURGERY

## 2023-06-26 PROCEDURE — 1159F PR MEDICATION LIST DOCUMENTED IN MEDICAL RECORD: ICD-10-PCS | Mod: CPTII,S$GLB,, | Performed by: ORTHOPAEDIC SURGERY

## 2023-06-26 PROCEDURE — 3075F SYST BP GE 130 - 139MM HG: CPT | Mod: CPTII,S$GLB,, | Performed by: ORTHOPAEDIC SURGERY

## 2023-06-26 PROCEDURE — 3008F BODY MASS INDEX DOCD: CPT | Mod: CPTII,S$GLB,, | Performed by: ORTHOPAEDIC SURGERY

## 2023-06-26 PROCEDURE — 3008F PR BODY MASS INDEX (BMI) DOCUMENTED: ICD-10-PCS | Mod: CPTII,S$GLB,, | Performed by: ORTHOPAEDIC SURGERY

## 2023-06-26 PROCEDURE — 4010F PR ACE/ARB THEARPY RXD/TAKEN: ICD-10-PCS | Mod: CPTII,S$GLB,, | Performed by: ORTHOPAEDIC SURGERY

## 2023-06-26 PROCEDURE — 1160F RVW MEDS BY RX/DR IN RCRD: CPT | Mod: CPTII,S$GLB,, | Performed by: ORTHOPAEDIC SURGERY

## 2023-06-26 PROCEDURE — 3044F PR MOST RECENT HEMOGLOBIN A1C LEVEL <7.0%: ICD-10-PCS | Mod: CPTII,S$GLB,, | Performed by: ORTHOPAEDIC SURGERY

## 2023-06-26 PROCEDURE — 1160F PR REVIEW ALL MEDS BY PRESCRIBER/CLIN PHARMACIST DOCUMENTED: ICD-10-PCS | Mod: CPTII,S$GLB,, | Performed by: ORTHOPAEDIC SURGERY

## 2023-06-26 PROCEDURE — 1159F MED LIST DOCD IN RCRD: CPT | Mod: CPTII,S$GLB,, | Performed by: ORTHOPAEDIC SURGERY

## 2023-06-26 RX ORDER — IBUPROFEN 800 MG/1
800 TABLET ORAL 3 TIMES DAILY
Qty: 90 TABLET | Refills: 0 | Status: SHIPPED | OUTPATIENT
Start: 2023-06-26 | End: 2023-07-26

## 2023-06-26 RX ORDER — TRIAMCINOLONE ACETONIDE 40 MG/ML
40 INJECTION, SUSPENSION INTRA-ARTICULAR; INTRAMUSCULAR
Status: DISCONTINUED | OUTPATIENT
Start: 2023-06-26 | End: 2023-06-26 | Stop reason: HOSPADM

## 2023-06-26 RX ADMIN — TRIAMCINOLONE ACETONIDE 40 MG: 40 INJECTION, SUSPENSION INTRA-ARTICULAR; INTRAMUSCULAR at 08:06

## 2023-06-26 NOTE — PROCEDURES
Large Joint Aspiration/Injection: L knee    Date/Time: 6/26/2023 8:30 AM  Performed by: Chas Mccarty MD  Authorized by: Chas Mccarty MD     Consent Done?:  Yes (Verbal)  Indications:  Pain  Site marked: the procedure site was marked    Timeout: prior to procedure the correct patient, procedure, and site was verified    Prep: patient was prepped and draped in usual sterile fashion      Local anesthesia used?: Yes    Local anesthetic:  Lidocaine 1% without epinephrine  Ultrasonic Guidance for needle placement?: No    Location:  Knee  Site:  L knee  Medications:  40 mg triamcinolone acetonide 40 mg/mL  Patient tolerance:  Patient tolerated the procedure well with no immediate complications

## 2023-06-26 NOTE — PROGRESS NOTES
Subjective:    Patient ID: Jarrod Lopez is a 42 y.o. male.    Chief Complaint: Post-op Evaluation of the Left Knee (PO LT knee scope 5/16/23, doing ok with some pain )      History of Present Illness    Prior to meeting with the patient I reviewed the medical chart in [facility]. This included reviewing the previous progress notes from our office, review of the patient's last appointment with their primary care provider, review of any visits to the emergency room, and review of any pain management appointments or procedures.  42-year-old male, returns to clinic today status post arthroscopy the left knee done on May 16th.  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  Rates his pain today at a 1/10.  Generally doing well.    Current Medications  Current Outpatient Medications   Medication Sig Dispense Refill    hydroCHLOROthiazide (HYDRODIURIL) 25 MG tablet Take 1 tablet (25 mg total) by mouth once daily. 30 tablet 2    losartan (COZAAR) 100 MG tablet Take 1 tablet (100 mg total) by mouth once daily. 90 tablet 3     No current facility-administered medications for this visit.       Allergies  Review of patient's allergies indicates:  No Known Allergies    Past Medical History  Past Medical History:   Diagnosis Date    Hypertension        Surgical History  Past Surgical History:   Procedure Laterality Date    CIRCUMCISION      15 years old    KNEE ARTHROSCOPY W/ MENISCECTOMY Left 5/16/2023    Procedure: ARTHROSCOPY, KNEE, WITH MENISCECTOMY, LEFT;  Surgeon: Chas Mccarty MD;  Location: [facility];  Service: Orthopedics;  Laterality: Left;       Family History:   Family History   Problem Relation Age of Onset    Hypertension Mother        Social History:   Social History     Socioeconomic History    Marital status: Single    Number of children: 5   Tobacco Use    Smoking status: Former     Types: Cigarettes    Smokeless tobacco: Never   Substance and  Sexual Activity    Alcohol use: No    Drug use: Yes     Types: Marijuana    Sexual activity: Not Currently     Partners: Female       Date of surgery:  May 16, 2023     Review of Systems     General/Constitutional: Chills denies. Fatigue denies. Fever denies. Weight gain denies. Weight loss denies.    Musculoskeletal: Comments: See HPI for details    Skin: Rash denies.    Objective:   Vital Signs:   Vitals:    06/26/23 0901   BP: 130/82        Physical Exam    This a well-developed, well nourished patient in no acute distress.  They are alert and oriented and cooperative to examination.  Pt. walks without an antalgic gait.      General Examination:     Constitutional: The patient is alert and oriented to lace person and time. Mood is pleasant.     Head/Face: Normal facial features normal eyebrows    Eyes: Normal extraocular motion bilaterally    Lungs: Respirations are equal and unlabored    Gait is coordinated.    Cardiovascular: There are no swelling or varicosities present.    Lymphatic: Negative for adenopathy    Skin: Normal    Neurological: Level of consciousness normal. Oriented to place person and time and situation    Psychiatric: Oriented to time place person and situation    Examination of the left knee, skin is dry and intact, no erythema or ecchymosis, no signs symptoms of infection.  No effusion.  Range of motion 0-120 degrees.  Stable anterior-posterior varus and valgus stress.  Calf soft nontender, straight leg raise negative.    XRAY Report/ Interpretation:  No new x-rays taken today, I did review x-rays of the bilateral knees which show valgus deformities and advanced osteoarthritis.      Assessment:       1. Status post arthroscopy of left knee    2. Primary osteoarthritis of left knee    3. Primary osteoarthritis of right knee        Plan:       Jarrod was seen today for post-op evaluation.    Diagnoses and all orders for this visit:    Status post arthroscopy of left knee  The following orders  "have not been finalized:  -     ibuprofen (ADVIL,MOTRIN) 800 MG tablet    Primary osteoarthritis of left knee  -     Large Joint Aspiration/Injection: L knee  The following orders have not been finalized:  -     ibuprofen (ADVIL,MOTRIN) 800 MG tablet    Primary osteoarthritis of right knee         Follow up in about 6 weeks (around 8/7/2023) for PO knee scope LT //, inj f/u.  This is to attest that the physician's assistant Michael Ellis served in the capacity as a scribe" for this patient encounter.  This is also to verify that I have reviewed the patient's history and helped formulate the treatment plan and discussed it with the physician's assistant.  I have actively participated in the evaluation and treatment plan for this patient is visit.  The treatment plan and medical decision-making is outlined below:  Stable status post arthroscopy of the left knee, patient underwent lateral meniscectomy now 6 weeks ago.  He has advanced osteoarthritis in the bilateral knees.  He rates his pain today as a 1 out of a 10 in his left knee but did request an injection.  We administer lidocaine and triamcinolone anterior lateral approach sterile technique patient tolerated well.  We also refilled his anti-inflammatory.  He will follow up with us in 6 weeks.  Unfortunately he will probably need a total knee arthroplasty at some point sooner rather than later.  Will contribute continue to treat him symptomatically for his underlying arthritis.    Treatment options were discussed with regards to the nature of the medical condition. Conservative pain intervention and surgical options were discussed in detail. The probability of success of each separate treatment option was discussed. The patient expressed a clear understanding of the treatment options. With regards to surgery, the procedure risk, benefits, complications, and outcomes were discussed. No guarantees were given with regards to surgical outcome.   The risk of " complications, morbidity, and mortality of patient management decisions have been made at the time of this visit. These are associated with the patient's problems, diagnostic procedures and treatment options. This includes the possible management options selected and those considered but not selected by the patient after shared medical decision making we discussed with the patient.     This note was created using Dragon voice recognition software that occasionally misinterpreted phrases or words.

## 2023-07-27 ENCOUNTER — OFFICE VISIT (OUTPATIENT)
Dept: FAMILY MEDICINE | Facility: CLINIC | Age: 43
End: 2023-07-27
Payer: COMMERCIAL

## 2023-07-27 VITALS
WEIGHT: 284 LBS | SYSTOLIC BLOOD PRESSURE: 140 MMHG | BODY MASS INDEX: 39.76 KG/M2 | TEMPERATURE: 98 F | DIASTOLIC BLOOD PRESSURE: 90 MMHG | RESPIRATION RATE: 20 BRPM | HEART RATE: 104 BPM | HEIGHT: 71 IN

## 2023-07-27 DIAGNOSIS — I10 ESSENTIAL HYPERTENSION: Primary | ICD-10-CM

## 2023-07-27 DIAGNOSIS — R73.03 PREDIABETES: ICD-10-CM

## 2023-07-27 DIAGNOSIS — Z13.1 DIABETES MELLITUS SCREENING: ICD-10-CM

## 2023-07-27 PROCEDURE — 4010F PR ACE/ARB THEARPY RXD/TAKEN: ICD-10-PCS | Mod: CPTII,S$GLB,, | Performed by: NURSE PRACTITIONER

## 2023-07-27 PROCEDURE — 3080F PR MOST RECENT DIASTOLIC BLOOD PRESSURE >= 90 MM HG: ICD-10-PCS | Mod: CPTII,S$GLB,, | Performed by: NURSE PRACTITIONER

## 2023-07-27 PROCEDURE — 4010F ACE/ARB THERAPY RXD/TAKEN: CPT | Mod: CPTII,S$GLB,, | Performed by: NURSE PRACTITIONER

## 2023-07-27 PROCEDURE — 3077F SYST BP >= 140 MM HG: CPT | Mod: CPTII,S$GLB,, | Performed by: NURSE PRACTITIONER

## 2023-07-27 PROCEDURE — 3044F PR MOST RECENT HEMOGLOBIN A1C LEVEL <7.0%: ICD-10-PCS | Mod: CPTII,S$GLB,, | Performed by: NURSE PRACTITIONER

## 2023-07-27 PROCEDURE — 1159F MED LIST DOCD IN RCRD: CPT | Mod: CPTII,S$GLB,, | Performed by: NURSE PRACTITIONER

## 2023-07-27 PROCEDURE — 3008F PR BODY MASS INDEX (BMI) DOCUMENTED: ICD-10-PCS | Mod: CPTII,S$GLB,, | Performed by: NURSE PRACTITIONER

## 2023-07-27 PROCEDURE — 3044F HG A1C LEVEL LT 7.0%: CPT | Mod: CPTII,S$GLB,, | Performed by: NURSE PRACTITIONER

## 2023-07-27 PROCEDURE — 3008F BODY MASS INDEX DOCD: CPT | Mod: CPTII,S$GLB,, | Performed by: NURSE PRACTITIONER

## 2023-07-27 PROCEDURE — 3077F PR MOST RECENT SYSTOLIC BLOOD PRESSURE >= 140 MM HG: ICD-10-PCS | Mod: CPTII,S$GLB,, | Performed by: NURSE PRACTITIONER

## 2023-07-27 PROCEDURE — 99213 OFFICE O/P EST LOW 20 MIN: CPT | Mod: S$GLB,,, | Performed by: NURSE PRACTITIONER

## 2023-07-27 PROCEDURE — 99213 PR OFFICE/OUTPT VISIT, EST, LEVL III, 20-29 MIN: ICD-10-PCS | Mod: S$GLB,,, | Performed by: NURSE PRACTITIONER

## 2023-07-27 PROCEDURE — 3080F DIAST BP >= 90 MM HG: CPT | Mod: CPTII,S$GLB,, | Performed by: NURSE PRACTITIONER

## 2023-07-27 PROCEDURE — 1159F PR MEDICATION LIST DOCUMENTED IN MEDICAL RECORD: ICD-10-PCS | Mod: CPTII,S$GLB,, | Performed by: NURSE PRACTITIONER

## 2023-07-27 RX ORDER — METOPROLOL SUCCINATE 25 MG/1
25 TABLET, EXTENDED RELEASE ORAL DAILY
Qty: 30 TABLET | Refills: 5 | Status: SHIPPED | OUTPATIENT
Start: 2023-07-27 | End: 2024-01-23

## 2023-07-27 NOTE — PROGRESS NOTES
Patient ID: Jarrod Lopez is a 42 y.o. male.    Chief Complaint: Follow-up (43 yo male here for HTN recheck. Pt also want to consult about possible starting Ozempic inj. KM)    Mr. Lopez presents today for regularly scheduled follow up for hypertension. His blood pressure is sub optimal during his visit today. He states he did not take his medication as prescribed. He continues to smoke as well as no monitor his diet. His pain is minimal during this visit. He is due for blood work to be updated and It is time for regimen change that has been thoroughly reviewed. He denies chest pain or heart palpitations. He denies any other issues or complaints.     Hypertension  This is a chronic problem. The current episode started more than 1 year ago. The problem has been waxing and waning since onset. The problem is uncontrolled. Associated symptoms include malaise/fatigue. Pertinent negatives include no chest pain, headaches or shortness of breath. Agents associated with hypertension include NSAIDs. Risk factors for coronary artery disease include male gender, obesity, dyslipidemia, smoking/tobacco exposure and stress. Past treatments include angiotensin blockers and diuretics.         Past Medical History:   Diagnosis Date    Hypertension      Past Surgical History:   Procedure Laterality Date    CIRCUMCISION      15 years old    KNEE ARTHROSCOPY W/ MENISCECTOMY Left 5/16/2023    Procedure: ARTHROSCOPY, KNEE, WITH MENISCECTOMY, LEFT;  Surgeon: Chas Mccarty MD;  Location: [facility];  Service: Orthopedics;  Laterality: Left;         Tobacco History:  reports that he has quit smoking. His smoking use included cigarettes. He has never used smokeless tobacco.      Review of patient's allergies indicates:  No Known Allergies    Current Outpatient Medications:     losartan (COZAAR) 100 MG tablet, Take 1 tablet (100 mg total) by mouth once daily., Disp: 90 tablet, Rfl: 3    hydroCHLOROthiazide (HYDRODIURIL) 25 MG tablet, Take 1  "tablet (25 mg total) by mouth once daily., Disp: 30 tablet, Rfl: 2    metoprolol succinate (TOPROL-XL) 25 MG 24 hr tablet, Take 1 tablet (25 mg total) by mouth once daily., Disp: 30 tablet, Rfl: 5    Review of Systems   Constitutional:  Positive for fatigue and malaise/fatigue. Negative for activity change, appetite change and fever.   HENT:  Negative for congestion, dental problem, nosebleeds, postnasal drip, rhinorrhea, sinus pain, sore throat and tinnitus.    Eyes:  Negative for pain, discharge, itching and visual disturbance.   Respiratory:  Negative for cough, chest tightness, shortness of breath and wheezing.    Cardiovascular:  Negative for chest pain.   Gastrointestinal:  Negative for abdominal pain, blood in stool, constipation, diarrhea, nausea and vomiting.   Endocrine: Negative for cold intolerance, heat intolerance, polydipsia, polyphagia and polyuria.   Genitourinary:  Negative for difficulty urinating, flank pain, genital sores, hematuria and urgency.   Musculoskeletal:  Negative for arthralgias and myalgias.   Skin:  Negative for rash and wound.   Allergic/Immunologic: Negative for environmental allergies and food allergies.   Neurological:  Negative for dizziness, light-headedness and headaches.   Hematological:  Negative for adenopathy. Does not bruise/bleed easily.   Psychiatric/Behavioral:  Negative for behavioral problems, confusion, decreased concentration, sleep disturbance and suicidal ideas. The patient is not nervous/anxious and is not hyperactive.         Objective:      Vitals:    07/27/23 1546 07/27/23 1612   BP: (!) 148/108 (!) 140/90   Pulse: 104    Resp: 20    Temp: 98.1 °F (36.7 °C)    TempSrc: Oral    Weight: 128.8 kg (284 lb)    Height: 5' 11" (1.803 m)      Physical Exam  Vitals reviewed.   Constitutional:       General: He is not in acute distress.     Appearance: Normal appearance. He is obese. He is not ill-appearing, toxic-appearing or diaphoretic.   HENT:      Head: " Normocephalic and atraumatic.      Right Ear: Tympanic membrane and external ear normal. There is no impacted cerumen.      Left Ear: Tympanic membrane and external ear normal. There is no impacted cerumen.      Nose: Nose normal. No congestion or rhinorrhea.      Mouth/Throat:      Mouth: Mucous membranes are moist.      Pharynx: Oropharynx is clear. No oropharyngeal exudate or posterior oropharyngeal erythema.   Eyes:      General: No scleral icterus.        Right eye: No discharge.         Left eye: No discharge.      Extraocular Movements: Extraocular movements intact.      Conjunctiva/sclera: Conjunctivae normal.      Pupils: Pupils are equal, round, and reactive to light.   Cardiovascular:      Rate and Rhythm: Normal rate and regular rhythm.      Pulses: Normal pulses.      Heart sounds: Normal heart sounds. No murmur heard.    No friction rub. No gallop.   Pulmonary:      Effort: Pulmonary effort is normal. No respiratory distress.      Breath sounds: Normal breath sounds. No wheezing, rhonchi or rales.   Chest:      Chest wall: No tenderness.   Abdominal:      General: Abdomen is flat. Bowel sounds are normal.      Palpations: Abdomen is soft. There is no mass.      Tenderness: There is no abdominal tenderness. There is no guarding or rebound.   Musculoskeletal:         General: No swelling or signs of injury. Normal range of motion.      Cervical back: Normal range of motion and neck supple. No rigidity or tenderness.      Right lower leg: No edema.      Left lower leg: No edema.   Skin:     General: Skin is warm and dry.      Capillary Refill: Capillary refill takes less than 2 seconds.      Coloration: Skin is not pale.      Findings: No bruising or erythema.   Neurological:      General: No focal deficit present.      Mental Status: He is alert and oriented to person, place, and time. Mental status is at baseline.      Motor: No weakness.      Coordination: Coordination normal.      Gait: Gait normal.    Psychiatric:         Mood and Affect: Mood normal.         Behavior: Behavior normal.         Thought Content: Thought content normal.         Judgment: Judgment normal.         Assessment:       1. Essential hypertension    2. Diabetes mellitus screening    3. Prediabetes           Plan:       Essential hypertension  -     CBC auto differential; Future; Expected date: 07/27/2023  -     Comprehensive Metabolic Panel; Future; Expected date: 07/27/2023  -     metoprolol succinate (TOPROL-XL) 25 MG 24 hr tablet; Take 1 tablet (25 mg total) by mouth once daily.  Dispense: 30 tablet; Refill: 5    Diabetes mellitus screening    Prediabetes  -     Comprehensive Metabolic Panel; Future; Expected date: 07/27/2023  -     Hemoglobin A1C; Future; Expected date: 07/27/2023      No follow-ups on file.        7/27/2023 Mirella Cleaning NP

## 2024-05-20 ENCOUNTER — PATIENT MESSAGE (OUTPATIENT)
Dept: ADMINISTRATIVE | Facility: HOSPITAL | Age: 44
End: 2024-05-20
Payer: COMMERCIAL

## 2024-08-21 ENCOUNTER — LAB VISIT (OUTPATIENT)
Dept: LAB | Facility: HOSPITAL | Age: 44
End: 2024-08-21
Attending: NURSE PRACTITIONER
Payer: MEDICAID

## 2024-08-21 ENCOUNTER — OFFICE VISIT (OUTPATIENT)
Dept: FAMILY MEDICINE | Facility: CLINIC | Age: 44
End: 2024-08-21
Payer: MEDICAID

## 2024-08-21 VITALS
WEIGHT: 271.5 LBS | BODY MASS INDEX: 38.01 KG/M2 | RESPIRATION RATE: 20 BRPM | HEIGHT: 71 IN | SYSTOLIC BLOOD PRESSURE: 180 MMHG | DIASTOLIC BLOOD PRESSURE: 110 MMHG | TEMPERATURE: 98 F | HEART RATE: 80 BPM

## 2024-08-21 DIAGNOSIS — Z13.220 SCREENING CHOLESTEROL LEVEL: ICD-10-CM

## 2024-08-21 DIAGNOSIS — I10 ESSENTIAL HYPERTENSION: Primary | ICD-10-CM

## 2024-08-21 DIAGNOSIS — M54.41 MIDLINE LOW BACK PAIN WITH RIGHT-SIDED SCIATICA, UNSPECIFIED CHRONICITY: ICD-10-CM

## 2024-08-21 DIAGNOSIS — R73.03 PREDIABETES: ICD-10-CM

## 2024-08-21 DIAGNOSIS — I10 ESSENTIAL HYPERTENSION: ICD-10-CM

## 2024-08-21 LAB
ALBUMIN SERPL BCP-MCNC: 3.8 G/DL (ref 3.5–5.2)
ALP SERPL-CCNC: 74 U/L (ref 55–135)
ALT SERPL W/O P-5'-P-CCNC: 22 U/L (ref 10–44)
ANION GAP SERPL CALC-SCNC: 10 MMOL/L (ref 8–16)
AST SERPL-CCNC: 23 U/L (ref 10–40)
BASOPHILS # BLD AUTO: 0.07 K/UL (ref 0–0.2)
BASOPHILS NFR BLD: 0.9 % (ref 0–1.9)
BILIRUB SERPL-MCNC: 0.3 MG/DL (ref 0.1–1)
BUN SERPL-MCNC: 13 MG/DL (ref 6–20)
CALCIUM SERPL-MCNC: 9.2 MG/DL (ref 8.7–10.5)
CHLORIDE SERPL-SCNC: 107 MMOL/L (ref 95–110)
CHOLEST SERPL-MCNC: 191 MG/DL (ref 120–199)
CHOLEST/HDLC SERPL: 3 {RATIO} (ref 2–5)
CO2 SERPL-SCNC: 25 MMOL/L (ref 23–29)
CREAT SERPL-MCNC: 0.9 MG/DL (ref 0.5–1.4)
DIFFERENTIAL METHOD BLD: ABNORMAL
EOSINOPHIL # BLD AUTO: 0.3 K/UL (ref 0–0.5)
EOSINOPHIL NFR BLD: 3.8 % (ref 0–8)
ERYTHROCYTE [DISTWIDTH] IN BLOOD BY AUTOMATED COUNT: 16.3 % (ref 11.5–14.5)
EST. GFR  (NO RACE VARIABLE): >60 ML/MIN/1.73 M^2
ESTIMATED AVG GLUCOSE: 103 MG/DL (ref 68–131)
GLUCOSE SERPL-MCNC: 87 MG/DL (ref 70–110)
HBA1C MFR BLD: 5.2 % (ref 4–5.6)
HCT VFR BLD AUTO: 42.4 % (ref 40–54)
HDLC SERPL-MCNC: 63 MG/DL (ref 40–75)
HDLC SERPL: 33 % (ref 20–50)
HGB BLD-MCNC: 13.8 G/DL (ref 14–18)
IMM GRANULOCYTES # BLD AUTO: 0.05 K/UL (ref 0–0.04)
IMM GRANULOCYTES NFR BLD AUTO: 0.6 % (ref 0–0.5)
LDLC SERPL CALC-MCNC: 118.6 MG/DL (ref 63–159)
LYMPHOCYTES # BLD AUTO: 2.2 K/UL (ref 1–4.8)
LYMPHOCYTES NFR BLD: 28 % (ref 18–48)
MCH RBC QN AUTO: 28.3 PG (ref 27–31)
MCHC RBC AUTO-ENTMCNC: 32.5 G/DL (ref 32–36)
MCV RBC AUTO: 87 FL (ref 82–98)
MONOCYTES # BLD AUTO: 0.7 K/UL (ref 0.3–1)
MONOCYTES NFR BLD: 8.3 % (ref 4–15)
NEUTROPHILS # BLD AUTO: 4.6 K/UL (ref 1.8–7.7)
NEUTROPHILS NFR BLD: 58.4 % (ref 38–73)
NONHDLC SERPL-MCNC: 128 MG/DL
NRBC BLD-RTO: 0 /100 WBC
PLATELET # BLD AUTO: 222 K/UL (ref 150–450)
PMV BLD AUTO: 11 FL (ref 9.2–12.9)
POTASSIUM SERPL-SCNC: 4.4 MMOL/L (ref 3.5–5.1)
PROT SERPL-MCNC: 7 G/DL (ref 6–8.4)
RBC # BLD AUTO: 4.88 M/UL (ref 4.6–6.2)
SODIUM SERPL-SCNC: 142 MMOL/L (ref 136–145)
TRIGL SERPL-MCNC: 47 MG/DL (ref 30–150)
TSH SERPL DL<=0.005 MIU/L-ACNC: 1.4 UIU/ML (ref 0.4–4)
WBC # BLD AUTO: 7.85 K/UL (ref 3.9–12.7)

## 2024-08-21 PROCEDURE — 3077F SYST BP >= 140 MM HG: CPT | Mod: CPTII,,, | Performed by: NURSE PRACTITIONER

## 2024-08-21 PROCEDURE — 80061 LIPID PANEL: CPT | Performed by: NURSE PRACTITIONER

## 2024-08-21 PROCEDURE — 84443 ASSAY THYROID STIM HORMONE: CPT | Performed by: NURSE PRACTITIONER

## 2024-08-21 PROCEDURE — 99214 OFFICE O/P EST MOD 30 MIN: CPT | Mod: PBBFAC,PN | Performed by: NURSE PRACTITIONER

## 2024-08-21 PROCEDURE — 99999 PR PBB SHADOW E&M-EST. PATIENT-LVL IV: CPT | Mod: PBBFAC,,, | Performed by: NURSE PRACTITIONER

## 2024-08-21 PROCEDURE — 83036 HEMOGLOBIN GLYCOSYLATED A1C: CPT | Performed by: NURSE PRACTITIONER

## 2024-08-21 PROCEDURE — 85025 COMPLETE CBC W/AUTO DIFF WBC: CPT | Performed by: NURSE PRACTITIONER

## 2024-08-21 PROCEDURE — 3080F DIAST BP >= 90 MM HG: CPT | Mod: CPTII,,, | Performed by: NURSE PRACTITIONER

## 2024-08-21 PROCEDURE — 4010F ACE/ARB THERAPY RXD/TAKEN: CPT | Mod: CPTII,,, | Performed by: NURSE PRACTITIONER

## 2024-08-21 PROCEDURE — 99214 OFFICE O/P EST MOD 30 MIN: CPT | Mod: S$PBB,,, | Performed by: NURSE PRACTITIONER

## 2024-08-21 PROCEDURE — 80053 COMPREHEN METABOLIC PANEL: CPT | Performed by: NURSE PRACTITIONER

## 2024-08-21 PROCEDURE — 36415 COLL VENOUS BLD VENIPUNCTURE: CPT | Performed by: NURSE PRACTITIONER

## 2024-08-21 PROCEDURE — 3008F BODY MASS INDEX DOCD: CPT | Mod: CPTII,,, | Performed by: NURSE PRACTITIONER

## 2024-08-21 PROCEDURE — 1159F MED LIST DOCD IN RCRD: CPT | Mod: CPTII,,, | Performed by: NURSE PRACTITIONER

## 2024-08-21 RX ORDER — LOSARTAN POTASSIUM 100 MG/1
100 TABLET ORAL DAILY
Qty: 90 TABLET | Refills: 3 | Status: SHIPPED | OUTPATIENT
Start: 2024-08-21 | End: 2025-08-21

## 2024-08-21 RX ORDER — METOPROLOL SUCCINATE 50 MG/1
50 TABLET, EXTENDED RELEASE ORAL DAILY
Qty: 30 TABLET | Refills: 5 | Status: SHIPPED | OUTPATIENT
Start: 2024-08-21 | End: 2025-02-17

## 2024-08-21 RX ORDER — HYDROCHLOROTHIAZIDE 25 MG/1
25 TABLET ORAL DAILY
Qty: 30 TABLET | Refills: 2 | Status: SHIPPED | OUTPATIENT
Start: 2024-08-21 | End: 2024-11-19

## 2024-08-21 RX ORDER — CYCLOBENZAPRINE HCL 10 MG
10 TABLET ORAL 3 TIMES DAILY PRN
Qty: 30 TABLET | Refills: 0 | Status: SHIPPED | OUTPATIENT
Start: 2024-08-21 | End: 2024-08-31

## 2024-08-21 NOTE — PROGRESS NOTES
Patient ID: Jarrod Lopez is a 43 y.o. male.    Chief Complaint: Annual Exam (44 yo male here for check. Pt states he been off prescribed meds due to insurance issues. KM)    Mr. Lopez  presents today for follow up for several medical issues after falling out of follow up for over one year. He states he has not taking his blood pressure medication in over 3 weeks. His blood pressure is elevated at this time. He denies chest  pain or heart palpitations that would warrant urgent evaluation. He declines being referred to ED for urgent evaluation but we discussed red flag symptoms. He is in need of updated blood work at this time. He denies any other issues or complaints.     Back Pain  This is a chronic problem. The current episode started more than 1 year ago. The problem occurs constantly. The problem has been waxing and waning since onset. The pain is present in the gluteal and sacro-iliac. The quality of the pain is described as aching, shooting and stabbing. The pain radiates to the left foot, left knee and left thigh. The pain is at a severity of 10/10. The pain is severe. The pain is Worse during the day. The symptoms are aggravated by bending and standing. Stiffness is present All day. Associated symptoms include bladder incontinence, bowel incontinence, leg pain, numbness, paresthesias, pelvic pain and weakness. Pertinent negatives include no abdominal pain, chest pain, dysuria, fever, headaches, paresis, perianal numbness, tingling or weight loss. Risk factors include history of osteoporosis and obesity. The treatment provided no relief.   Hypertension  This is a chronic problem. The current episode started more than 1 year ago. The problem has been waxing and waning since onset. The problem is uncontrolled. Pertinent negatives include no chest pain or headaches. Risk factors for coronary artery disease include diabetes mellitus, dyslipidemia, smoking/tobacco exposure, male gender, obesity, stress  and family history. Past treatments include angiotensin blockers. Compliance problems include diet, exercise and medication side effects.  There is no history of angina, kidney disease, CAD/MI, CVA, heart failure, left ventricular hypertrophy, PVD or retinopathy. There is no history of chronic renal disease, coarctation of the aorta, hyperaldosteronism, hypercortisolism, hyperparathyroidism, a hypertension causing med, pheochromocytoma, renovascular disease, sleep apnea or a thyroid problem.           Past Medical History:   Diagnosis Date    Hypertension      Past Surgical History:   Procedure Laterality Date    CIRCUMCISION      15 years old    KNEE ARTHROSCOPY W/ MENISCECTOMY Left 5/16/2023    Procedure: ARTHROSCOPY, KNEE, WITH MENISCECTOMY, LEFT;  Surgeon: Chas Mccarty MD;  Location: [facility];  Service: Orthopedics;  Laterality: Left;         Tobacco History:  reports that he has quit smoking. His smoking use included cigarettes. He has been exposed to tobacco smoke. He has never used smokeless tobacco.      Review of patient's allergies indicates:  No Known Allergies    Current Outpatient Medications:     cyclobenzaprine (FLEXERIL) 10 MG tablet, Take 1 tablet (10 mg total) by mouth 3 (three) times daily as needed for Muscle spasms., Disp: 30 tablet, Rfl: 0    hydroCHLOROthiazide (HYDRODIURIL) 25 MG tablet, Take 1 tablet (25 mg total) by mouth once daily., Disp: 30 tablet, Rfl: 2    losartan (COZAAR) 100 MG tablet, Take 1 tablet (100 mg total) by mouth once daily., Disp: 90 tablet, Rfl: 3    metoprolol succinate (TOPROL-XL) 50 MG 24 hr tablet, Take 1 tablet (50 mg total) by mouth once daily., Disp: 30 tablet, Rfl: 5    Review of Systems   Constitutional:  Negative for fever and weight loss.   Cardiovascular:  Negative for chest pain.   Gastrointestinal:  Positive for bowel incontinence. Negative for abdominal pain.   Genitourinary:  Positive for bladder incontinence and pelvic pain. Negative for dysuria and  "hematuria.   Musculoskeletal:  Positive for back pain.   Neurological:  Positive for weakness, numbness and paresthesias. Negative for tingling and headaches.          Objective:      Vitals:    08/21/24 0832 08/21/24 0855   BP: (!) 190/130 (!) 180/110   Pulse: 80    Resp: 20    Temp: 98 °F (36.7 °C)    TempSrc: Oral    Weight: 123.2 kg (271 lb 8 oz)    Height: 5' 11" (1.803 m)      Physical Exam  Constitutional:       Appearance: Normal appearance. He is obese.   Cardiovascular:      Rate and Rhythm: Normal rate and regular rhythm.      Heart sounds: Normal heart sounds.   Pulmonary:      Effort: Pulmonary effort is normal.      Breath sounds: Normal breath sounds.   Abdominal:      General: Bowel sounds are normal.      Palpations: Abdomen is soft.   Musculoskeletal:         General: Normal range of motion.   Neurological:      Mental Status: He is alert and oriented to person, place, and time. Mental status is at baseline.   Psychiatric:         Mood and Affect: Mood normal.         Behavior: Behavior normal.           Assessment:       1. Essential hypertension    2. Prediabetes    3. Midline low back pain with right-sided sciatica, unspecified chronicity    4. Screening cholesterol level           Plan:       Essential hypertension  -     CBC W/ AUTO DIFFERENTIAL; Future; Expected date: 08/21/2024  -     COMPREHENSIVE METABOLIC PANEL; Future; Expected date: 08/21/2024  -     TSH; Future; Expected date: 08/21/2024  -     losartan (COZAAR) 100 MG tablet; Take 1 tablet (100 mg total) by mouth once daily.  Dispense: 90 tablet; Refill: 3  -     hydroCHLOROthiazide (HYDRODIURIL) 25 MG tablet; Take 1 tablet (25 mg total) by mouth once daily.  Dispense: 30 tablet; Refill: 2  -     metoprolol succinate (TOPROL-XL) 50 MG 24 hr tablet; Take 1 tablet (50 mg total) by mouth once daily.  Dispense: 30 tablet; Refill: 5    Prediabetes  -     HEMOGLOBIN A1C; Future; Expected date: 08/21/2024  -     COMPREHENSIVE METABOLIC " PANEL; Future; Expected date: 08/21/2024    Midline low back pain with right-sided sciatica, unspecified chronicity  -     cyclobenzaprine (FLEXERIL) 10 MG tablet; Take 1 tablet (10 mg total) by mouth 3 (three) times daily as needed for Muscle spasms.  Dispense: 30 tablet; Refill: 0    Screening cholesterol level  -     LIPID PANEL; Future; Expected date: 08/21/2024      Follow up in about 2 weeks (around 9/4/2024), or if symptoms worsen or fail to improve, for Blood pressure recheck and lab review.      Counseled on heart healthy diet rich in fiber, fresh vegetables and fruit and low in saturated fats (fried foods, red meat, etc.).  I also recommend regular exercise including a minimum of 150 minutes of cardio exercise per week and 20-30 minute workouts of strength training like light weights, yoga, pilates, etc.      Spent escobar 30 minutes with patient which involved review of pts medical conditions, labs, medications and with 50% of time face-to-face discussion about medical problems, management and any applicable changes.    8/21/2024 Mirella Cleaning NP      Answers submitted by the patient for this visit:  Back Pain Questionnaire (Submitted on 8/21/2024)  Chief Complaint: Back pain  genital pain: No

## 2024-10-08 ENCOUNTER — OFFICE VISIT (OUTPATIENT)
Dept: FAMILY MEDICINE | Facility: CLINIC | Age: 44
End: 2024-10-08
Payer: MEDICAID

## 2024-10-08 VITALS
SYSTOLIC BLOOD PRESSURE: 142 MMHG | BODY MASS INDEX: 36.37 KG/M2 | RESPIRATION RATE: 20 BRPM | HEART RATE: 64 BPM | HEIGHT: 71 IN | DIASTOLIC BLOOD PRESSURE: 100 MMHG | TEMPERATURE: 98 F | WEIGHT: 259.81 LBS

## 2024-10-08 DIAGNOSIS — G89.29 CHRONIC BILATERAL LOW BACK PAIN WITHOUT SCIATICA: ICD-10-CM

## 2024-10-08 DIAGNOSIS — G89.29 CHRONIC PAIN OF BOTH KNEES: Primary | ICD-10-CM

## 2024-10-08 DIAGNOSIS — M25.562 CHRONIC PAIN OF BOTH KNEES: Primary | ICD-10-CM

## 2024-10-08 DIAGNOSIS — M25.561 CHRONIC PAIN OF BOTH KNEES: Primary | ICD-10-CM

## 2024-10-08 DIAGNOSIS — I10 ESSENTIAL HYPERTENSION: ICD-10-CM

## 2024-10-08 DIAGNOSIS — M54.50 CHRONIC BILATERAL LOW BACK PAIN WITHOUT SCIATICA: ICD-10-CM

## 2024-10-08 PROCEDURE — 3008F BODY MASS INDEX DOCD: CPT | Mod: CPTII,,, | Performed by: NURSE PRACTITIONER

## 2024-10-08 PROCEDURE — 3080F DIAST BP >= 90 MM HG: CPT | Mod: CPTII,,, | Performed by: NURSE PRACTITIONER

## 2024-10-08 PROCEDURE — 4010F ACE/ARB THERAPY RXD/TAKEN: CPT | Mod: CPTII,,, | Performed by: NURSE PRACTITIONER

## 2024-10-08 PROCEDURE — 3044F HG A1C LEVEL LT 7.0%: CPT | Mod: CPTII,,, | Performed by: NURSE PRACTITIONER

## 2024-10-08 PROCEDURE — 99214 OFFICE O/P EST MOD 30 MIN: CPT | Mod: S$PBB,,, | Performed by: NURSE PRACTITIONER

## 2024-10-08 PROCEDURE — 1159F MED LIST DOCD IN RCRD: CPT | Mod: CPTII,,, | Performed by: NURSE PRACTITIONER

## 2024-10-08 PROCEDURE — 99214 OFFICE O/P EST MOD 30 MIN: CPT | Mod: PBBFAC,PN | Performed by: NURSE PRACTITIONER

## 2024-10-08 PROCEDURE — 99999 PR PBB SHADOW E&M-EST. PATIENT-LVL IV: CPT | Mod: PBBFAC,,, | Performed by: NURSE PRACTITIONER

## 2024-10-08 PROCEDURE — 3077F SYST BP >= 140 MM HG: CPT | Mod: CPTII,,, | Performed by: NURSE PRACTITIONER

## 2024-10-08 RX ORDER — AMLODIPINE BESYLATE 10 MG/1
10 TABLET ORAL DAILY
Qty: 90 TABLET | Refills: 3 | Status: SHIPPED | OUTPATIENT
Start: 2024-10-08 | End: 2025-10-08

## 2024-10-08 RX ORDER — OLMESARTAN MEDOXOMIL 40 MG/1
40 TABLET ORAL DAILY
Qty: 90 TABLET | Refills: 3 | Status: SHIPPED | OUTPATIENT
Start: 2024-10-08 | End: 2025-10-08

## 2024-10-08 NOTE — PROGRESS NOTES
Patient ID: Jarrod Lopez is a 44 y.o. male.    Chief Complaint: Follow-up (45 yo male here for HTN recheck. Pt states constant pain from hip down with limited standing due to pain. KM)    Mr. Lopez presents today for blood pressure recheck. He states he has been taking all medication as prescribed. While his blood pressure is still elevated he denies any chest pain or heart palpitations. He also states he continues to be in a considerable amount of pain. He is requesting referral to pain specialist for chronic back and hip pain. He denies new injury to the area. I will refer him to cardiology at this time for further workup. We discussed lifestyle changes such as decreasing alcohol intake and smoking. He denies any other issues or complaints at the time of this visit.     Hypertension  This is a chronic problem. The current episode started more than 1 year ago. The problem has been waxing and waning since onset. The problem is resistant. Associated symptoms include anxiety, malaise/fatigue and neck pain. Agents associated with hypertension include amphetamines. Risk factors for coronary artery disease include male gender, obesity, stress, smoking/tobacco exposure, sedentary lifestyle, family history and dyslipidemia. Past treatments include ACE inhibitors, angiotensin blockers, calcium channel blockers, diuretics and lifestyle changes. The current treatment provides mild improvement. Compliance problems include exercise, diet, medication cost and medication side effects.      Past Medical History:   Diagnosis Date    Hypertension      Past Surgical History:   Procedure Laterality Date    CIRCUMCISION      15 years old    KNEE ARTHROSCOPY W/ MENISCECTOMY Left 5/16/2023    Procedure: ARTHROSCOPY, KNEE, WITH MENISCECTOMY, LEFT;  Surgeon: Chas Mccarty MD;  Location: [facility];  Service: Orthopedics;  Laterality: Left;         Tobacco History:  reports that he has quit smoking. His smoking use included  "cigarettes. He has been exposed to tobacco smoke. He has never used smokeless tobacco.      Review of patient's allergies indicates:  No Known Allergies    Current Outpatient Medications:     hydroCHLOROthiazide (HYDRODIURIL) 25 MG tablet, Take 1 tablet (25 mg total) by mouth once daily., Disp: 30 tablet, Rfl: 2    metoprolol succinate (TOPROL-XL) 50 MG 24 hr tablet, Take 1 tablet (50 mg total) by mouth once daily., Disp: 30 tablet, Rfl: 5    amLODIPine (NORVASC) 10 MG tablet, Take 1 tablet (10 mg total) by mouth once daily., Disp: 90 tablet, Rfl: 3    olmesartan (BENICAR) 40 MG tablet, Take 1 tablet (40 mg total) by mouth once daily., Disp: 90 tablet, Rfl: 3    Review of Systems   Constitutional:  Positive for activity change, appetite change, fatigue and malaise/fatigue.   Musculoskeletal:  Positive for arthralgias, back pain and neck pain.   Psychiatric/Behavioral:  Positive for dysphoric mood.           Objective:      Vitals:    10/08/24 0957 10/08/24 1041   BP: (!) 158/104 (!) 142/100   Pulse: 64    Resp: 20    Temp: 98 °F (36.7 °C)    TempSrc: Oral    Weight: 117.8 kg (259 lb 12.8 oz)    Height: 5' 11" (1.803 m)      Physical Exam  Constitutional:       Appearance: Normal appearance. He is obese.   Neck:     Cardiovascular:      Rate and Rhythm: Normal rate and regular rhythm.      Heart sounds: Normal heart sounds.   Pulmonary:      Effort: Pulmonary effort is normal.      Breath sounds: Normal breath sounds.   Abdominal:      General: Bowel sounds are normal.      Palpations: Abdomen is soft.   Musculoskeletal:      Cervical back: Pain with movement present. Decreased range of motion.      Lumbar back: Spasms and tenderness present. Decreased range of motion.        Back:    Neurological:      Mental Status: He is alert and oriented to person, place, and time. Mental status is at baseline.   Psychiatric:         Mood and Affect: Mood normal.         Behavior: Behavior normal.           Assessment:       1. " Chronic pain of both knees    2. Essential hypertension    3. Chronic bilateral low back pain without sciatica           Plan:       Chronic pain of both knees  -     Ambulatory referral/consult to Pain Clinic; Future; Expected date: 10/15/2024    Essential hypertension  -     olmesartan (BENICAR) 40 MG tablet; Take 1 tablet (40 mg total) by mouth once daily.  Dispense: 90 tablet; Refill: 3  -     Ambulatory referral/consult to Cardiology; Future; Expected date: 10/15/2024  -     amLODIPine (NORVASC) 10 MG tablet; Take 1 tablet (10 mg total) by mouth once daily.  Dispense: 90 tablet; Refill: 3    Chronic bilateral low back pain without sciatica  -     Ambulatory referral/consult to Pain Clinic; Future; Expected date: 10/15/2024      Follow up in about 4 weeks (around 11/5/2024), or if symptoms worsen or fail to improve, for Bllod pressure recheck.        10/8/2024 Mirella Cleaning NP    Spent escobar 30 minutes with patient which involved review of pts medical conditions, labs, medications and with 50% of time face-to-face discussion about medical problems, management and any applicable changes.

## 2024-10-28 DIAGNOSIS — Z76.89 ENCOUNTER TO ESTABLISH CARE: Primary | ICD-10-CM

## 2025-01-02 ENCOUNTER — PATIENT MESSAGE (OUTPATIENT)
Dept: ADMINISTRATIVE | Facility: HOSPITAL | Age: 45
End: 2025-01-02
Payer: MEDICAID

## 2025-03-10 ENCOUNTER — PATIENT MESSAGE (OUTPATIENT)
Dept: ADMINISTRATIVE | Facility: HOSPITAL | Age: 45
End: 2025-03-10
Payer: MEDICAID

## 2025-04-03 ENCOUNTER — PATIENT MESSAGE (OUTPATIENT)
Dept: ADMINISTRATIVE | Facility: HOSPITAL | Age: 45
End: 2025-04-03
Payer: MEDICAID

## 2025-04-16 ENCOUNTER — PATIENT MESSAGE (OUTPATIENT)
Dept: ADMINISTRATIVE | Facility: OTHER | Age: 45
End: 2025-04-16
Payer: MEDICAID

## 2025-07-29 ENCOUNTER — TELEPHONE (OUTPATIENT)
Dept: FAMILY MEDICINE | Facility: CLINIC | Age: 45
End: 2025-07-29
Payer: MEDICAID

## 2025-07-29 NOTE — TELEPHONE ENCOUNTER
Pt outreach call regard b/p reading/monitor. Pt states he is scheduled to get health insurance in 10/2025 and will contact clinic to schedule a appt. Pt adv no monitor of B/P and not experiencing any abnormal signs or symptoms. JAIDEN

## (undated) DEVICE — SET IRR URLGY 2LINE UNIV SPIKE

## (undated) DEVICE — COVER SURG LIGHT HANDLE

## (undated) DEVICE — STRAP OR TABLE 5IN X 72IN

## (undated) DEVICE — SLEEVE SCD EXPRESS KNEE MEDIUM

## (undated) DEVICE — DRAPE STERI-DRAPE 1000 17X11IN

## (undated) DEVICE — DRESSING N ADH OIL EMUL 3X3

## (undated) DEVICE — BANDAGE ESMARK ELASTIC ST 6X9

## (undated) DEVICE — ALCOHOL 70% ISOP RUBBING 4OZ

## (undated) DEVICE — GLOVE SURG ULTRA TOUCH 8

## (undated) DEVICE — SYR LUER LOCK STERILE 10ML

## (undated) DEVICE — NDL SAFETY 21G X 1 1/2 ECLPSE

## (undated) DEVICE — GOWN POLY REINF BRTH SLV LG

## (undated) DEVICE — PADDING WYTEX UNDRCST 6INX4YD

## (undated) DEVICE — BANDAGE MATRIX HK LOOP 6IN 5YD

## (undated) DEVICE — UNDERGLOVES BIOGEL PI SIZE 8.5

## (undated) DEVICE — PADDING CAST SPECIALIST 6X4YD

## (undated) DEVICE — APPLICATOR CHLORAPREP ORN 26ML

## (undated) DEVICE — PACK SET UP 190 OMC-NS

## (undated) DEVICE — CONTAINER SPECIMEN OR STER 4OZ

## (undated) DEVICE — NDL SPINAL 18GX3.5 SPINOCAN

## (undated) DEVICE — RESECTOR 5.5MM

## (undated) DEVICE — GOWN POLY REINF BRTH SLV 2XL

## (undated) DEVICE — SYR 30CC LUER LOCK

## (undated) DEVICE — UNDERGLOVES BIOGEL PI SIZE 8

## (undated) DEVICE — BLADE SURG CARBON STEEL SZ11

## (undated) DEVICE — TOURNIQUET SB QC SP 44X4IN

## (undated) DEVICE — PACK LOWER EXTREMITY

## (undated) DEVICE — BNDG COFLEX FOAM LF2 ST 6X5YD

## (undated) DEVICE — SOL IRR NACL .9% 3000ML

## (undated) DEVICE — TOWEL OR DISP STRL BLUE 4/PK

## (undated) DEVICE — SUT ETHILON 4-0 PS2 18 BLK